# Patient Record
Sex: MALE | Race: BLACK OR AFRICAN AMERICAN | NOT HISPANIC OR LATINO | URBAN - METROPOLITAN AREA
[De-identification: names, ages, dates, MRNs, and addresses within clinical notes are randomized per-mention and may not be internally consistent; named-entity substitution may affect disease eponyms.]

---

## 2015-03-04 RX ORDER — ATORVASTATIN CALCIUM 80 MG/1
1 TABLET, FILM COATED ORAL
Qty: 0 | Refills: 0 | DISCHARGE
Start: 2015-03-04

## 2017-08-14 ENCOUNTER — INPATIENT (INPATIENT)
Facility: HOSPITAL | Age: 57
LOS: 4 days | Discharge: HOME | DRG: 286 | End: 2017-08-19
Attending: GENERAL PRACTICE | Admitting: GENERAL PRACTICE
Payer: COMMERCIAL

## 2017-08-14 VITALS
TEMPERATURE: 99 F | OXYGEN SATURATION: 97 % | WEIGHT: 270.07 LBS | RESPIRATION RATE: 16 BRPM | DIASTOLIC BLOOD PRESSURE: 78 MMHG | HEART RATE: 80 BPM | SYSTOLIC BLOOD PRESSURE: 120 MMHG

## 2017-08-14 DIAGNOSIS — I50.22 CHRONIC SYSTOLIC (CONGESTIVE) HEART FAILURE: ICD-10-CM

## 2017-08-14 DIAGNOSIS — E78.4 OTHER HYPERLIPIDEMIA: ICD-10-CM

## 2017-08-14 DIAGNOSIS — I48.91 UNSPECIFIED ATRIAL FIBRILLATION: ICD-10-CM

## 2017-08-14 DIAGNOSIS — I10 ESSENTIAL (PRIMARY) HYPERTENSION: ICD-10-CM

## 2017-08-14 DIAGNOSIS — I20.8 OTHER FORMS OF ANGINA PECTORIS: ICD-10-CM

## 2017-08-14 DIAGNOSIS — Z95.810 PRESENCE OF AUTOMATIC (IMPLANTABLE) CARDIAC DEFIBRILLATOR: Chronic | ICD-10-CM

## 2017-08-14 LAB
ANION GAP SERPL CALC-SCNC: 14 MMOL/L — SIGNIFICANT CHANGE UP (ref 5–17)
APTT BLD: 32 SEC — SIGNIFICANT CHANGE UP (ref 27.5–37.4)
APTT BLD: 54.5 SEC — HIGH (ref 27.5–37.4)
BASOPHILS # BLD AUTO: 0.2 K/UL — SIGNIFICANT CHANGE UP (ref 0–0.2)
BASOPHILS NFR BLD AUTO: 3 % — HIGH (ref 0–2)
BUN SERPL-MCNC: 14 MG/DL — SIGNIFICANT CHANGE UP (ref 7–23)
CALCIUM SERPL-MCNC: 9.2 MG/DL — SIGNIFICANT CHANGE UP (ref 8.4–10.5)
CHLORIDE SERPL-SCNC: 107 MMOL/L — SIGNIFICANT CHANGE UP (ref 96–108)
CK MB BLD-MCNC: 0.7 % — SIGNIFICANT CHANGE UP (ref 0–3.5)
CK MB BLD-MCNC: 0.7 % — SIGNIFICANT CHANGE UP (ref 0–3.5)
CK MB CFR SERPL CALC: 2.2 NG/ML — SIGNIFICANT CHANGE UP (ref 0–6.7)
CK MB CFR SERPL CALC: 2.2 NG/ML — SIGNIFICANT CHANGE UP (ref 0–6.7)
CK SERPL-CCNC: 297 U/L — HIGH (ref 30–200)
CK SERPL-CCNC: 312 U/L — HIGH (ref 30–200)
CO2 SERPL-SCNC: 22 MMOL/L — SIGNIFICANT CHANGE UP (ref 22–31)
CREAT SERPL-MCNC: 1.23 MG/DL — SIGNIFICANT CHANGE UP (ref 0.5–1.3)
DIGOXIN SERPL-MCNC: 0.3 NG/ML — LOW (ref 0.8–2)
EOSINOPHIL # BLD AUTO: 0.5 K/UL — SIGNIFICANT CHANGE UP (ref 0–0.5)
EOSINOPHIL NFR BLD AUTO: 7.2 % — HIGH (ref 0–6)
GLUCOSE SERPL-MCNC: 112 MG/DL — HIGH (ref 70–99)
HCT VFR BLD CALC: 46.2 % — SIGNIFICANT CHANGE UP (ref 39–50)
HCT VFR BLD CALC: 46.8 % — SIGNIFICANT CHANGE UP (ref 39–50)
HGB BLD-MCNC: 15.5 G/DL — SIGNIFICANT CHANGE UP (ref 13–17)
HGB BLD-MCNC: 15.6 G/DL — SIGNIFICANT CHANGE UP (ref 13–17)
INR BLD: 1.12 RATIO — SIGNIFICANT CHANGE UP (ref 0.88–1.16)
LYMPHOCYTES # BLD AUTO: 1.1 K/UL — SIGNIFICANT CHANGE UP (ref 1–3.3)
LYMPHOCYTES # BLD AUTO: 16.7 % — SIGNIFICANT CHANGE UP (ref 13–44)
MAGNESIUM SERPL-MCNC: 2 MG/DL — SIGNIFICANT CHANGE UP (ref 1.6–2.6)
MCHC RBC-ENTMCNC: 33.2 GM/DL — SIGNIFICANT CHANGE UP (ref 32–36)
MCHC RBC-ENTMCNC: 33.3 PG — SIGNIFICANT CHANGE UP (ref 27–34)
MCHC RBC-ENTMCNC: 33.7 GM/DL — SIGNIFICANT CHANGE UP (ref 32–36)
MCHC RBC-ENTMCNC: 33.7 PG — SIGNIFICANT CHANGE UP (ref 27–34)
MCV RBC AUTO: 100 FL — SIGNIFICANT CHANGE UP (ref 80–100)
MCV RBC AUTO: 100 FL — SIGNIFICANT CHANGE UP (ref 80–100)
MONOCYTES # BLD AUTO: 0.6 K/UL — SIGNIFICANT CHANGE UP (ref 0–0.9)
MONOCYTES NFR BLD AUTO: 10.1 % — SIGNIFICANT CHANGE UP (ref 2–14)
NEUTROPHILS # BLD AUTO: 4 K/UL — SIGNIFICANT CHANGE UP (ref 1.8–7.4)
NEUTROPHILS NFR BLD AUTO: 63 % — SIGNIFICANT CHANGE UP (ref 43–77)
NT-PROBNP SERPL-SCNC: 667 PG/ML — HIGH (ref 0–300)
PHOSPHATE SERPL-MCNC: 3.1 MG/DL — SIGNIFICANT CHANGE UP (ref 2.5–4.5)
PLATELET # BLD AUTO: 154 K/UL — SIGNIFICANT CHANGE UP (ref 150–400)
PLATELET # BLD AUTO: 164 K/UL — SIGNIFICANT CHANGE UP (ref 150–400)
POTASSIUM SERPL-MCNC: 4.3 MMOL/L — SIGNIFICANT CHANGE UP (ref 3.5–5.3)
POTASSIUM SERPL-SCNC: 4.3 MMOL/L — SIGNIFICANT CHANGE UP (ref 3.5–5.3)
PROTHROM AB SERPL-ACNC: 12.2 SEC — SIGNIFICANT CHANGE UP (ref 9.8–12.7)
RBC # BLD: 4.61 M/UL — SIGNIFICANT CHANGE UP (ref 4.2–5.8)
RBC # BLD: 4.67 M/UL — SIGNIFICANT CHANGE UP (ref 4.2–5.8)
RBC # FLD: 12.9 % — SIGNIFICANT CHANGE UP (ref 10.3–14.5)
RBC # FLD: 12.9 % — SIGNIFICANT CHANGE UP (ref 10.3–14.5)
SODIUM SERPL-SCNC: 143 MMOL/L — SIGNIFICANT CHANGE UP (ref 135–145)
TROPONIN T SERPL-MCNC: <0.01 NG/ML — SIGNIFICANT CHANGE UP (ref 0–0.06)
TROPONIN T SERPL-MCNC: <0.01 NG/ML — SIGNIFICANT CHANGE UP (ref 0–0.06)
WBC # BLD: 6.4 K/UL — SIGNIFICANT CHANGE UP (ref 3.8–10.5)
WBC # BLD: 6.8 K/UL — SIGNIFICANT CHANGE UP (ref 3.8–10.5)
WBC # FLD AUTO: 6.4 K/UL — SIGNIFICANT CHANGE UP (ref 3.8–10.5)
WBC # FLD AUTO: 6.8 K/UL — SIGNIFICANT CHANGE UP (ref 3.8–10.5)

## 2017-08-14 PROCEDURE — 93308 TTE F-UP OR LMTD: CPT | Mod: 26

## 2017-08-14 PROCEDURE — 99285 EMERGENCY DEPT VISIT HI MDM: CPT | Mod: 25

## 2017-08-14 PROCEDURE — 93010 ELECTROCARDIOGRAM REPORT: CPT

## 2017-08-14 PROCEDURE — 71020: CPT | Mod: 26

## 2017-08-14 RX ORDER — HEPARIN SODIUM 5000 [USP'U]/ML
6000 INJECTION INTRAVENOUS; SUBCUTANEOUS EVERY 6 HOURS
Qty: 0 | Refills: 0 | Status: DISCONTINUED | OUTPATIENT
Start: 2017-08-14 | End: 2017-08-15

## 2017-08-14 RX ORDER — HEPARIN SODIUM 5000 [USP'U]/ML
INJECTION INTRAVENOUS; SUBCUTANEOUS
Qty: 25000 | Refills: 0 | Status: DISCONTINUED | OUTPATIENT
Start: 2017-08-14 | End: 2017-08-15

## 2017-08-14 RX ORDER — METOPROLOL TARTRATE 50 MG
100 TABLET ORAL DAILY
Qty: 0 | Refills: 0 | Status: DISCONTINUED | OUTPATIENT
Start: 2017-08-14 | End: 2017-08-19

## 2017-08-14 RX ORDER — ASPIRIN/CALCIUM CARB/MAGNESIUM 324 MG
81 TABLET ORAL DAILY
Qty: 0 | Refills: 0 | Status: DISCONTINUED | OUTPATIENT
Start: 2017-08-14 | End: 2017-08-19

## 2017-08-14 RX ORDER — SPIRONOLACTONE 25 MG/1
0 TABLET, FILM COATED ORAL
Qty: 0 | Refills: 0 | COMMUNITY

## 2017-08-14 RX ORDER — DIGOXIN 250 MCG
0.12 TABLET ORAL DAILY
Qty: 0 | Refills: 0 | Status: DISCONTINUED | OUTPATIENT
Start: 2017-08-14 | End: 2017-08-19

## 2017-08-14 RX ORDER — ATORVASTATIN CALCIUM 80 MG/1
20 TABLET, FILM COATED ORAL AT BEDTIME
Qty: 0 | Refills: 0 | Status: DISCONTINUED | OUTPATIENT
Start: 2017-08-14 | End: 2017-08-19

## 2017-08-14 RX ORDER — SPIRONOLACTONE 25 MG/1
25 TABLET, FILM COATED ORAL DAILY
Qty: 0 | Refills: 0 | Status: DISCONTINUED | OUTPATIENT
Start: 2017-08-14 | End: 2017-08-19

## 2017-08-14 RX ORDER — FUROSEMIDE 40 MG
40 TABLET ORAL DAILY
Qty: 0 | Refills: 0 | Status: DISCONTINUED | OUTPATIENT
Start: 2017-08-14 | End: 2017-08-19

## 2017-08-14 RX ORDER — HEPARIN SODIUM 5000 [USP'U]/ML
5000 INJECTION INTRAVENOUS; SUBCUTANEOUS ONCE
Qty: 0 | Refills: 0 | Status: COMPLETED | OUTPATIENT
Start: 2017-08-14 | End: 2017-08-14

## 2017-08-14 RX ORDER — SACUBITRIL AND VALSARTAN 24; 26 MG/1; MG/1
1 TABLET, FILM COATED ORAL
Qty: 0 | Refills: 0 | Status: DISCONTINUED | OUTPATIENT
Start: 2017-08-14 | End: 2017-08-19

## 2017-08-14 RX ORDER — SACUBITRIL AND VALSARTAN 24; 26 MG/1; MG/1
0 TABLET, FILM COATED ORAL
Qty: 0 | Refills: 0 | COMMUNITY

## 2017-08-14 RX ADMIN — HEPARIN SODIUM 1000 UNIT(S)/HR: 5000 INJECTION INTRAVENOUS; SUBCUTANEOUS at 21:37

## 2017-08-14 RX ADMIN — SACUBITRIL AND VALSARTAN 1 TABLET(S): 24; 26 TABLET, FILM COATED ORAL at 21:56

## 2017-08-14 RX ADMIN — HEPARIN SODIUM 1000 UNIT(S)/HR: 5000 INJECTION INTRAVENOUS; SUBCUTANEOUS at 14:26

## 2017-08-14 RX ADMIN — HEPARIN SODIUM 5000 UNIT(S): 5000 INJECTION INTRAVENOUS; SUBCUTANEOUS at 14:24

## 2017-08-14 NOTE — ED ADULT NURSE NOTE - OBJECTIVE STATEMENT
Pt is a 57 yo M who came to the ED amb c/o sent for new onset A fib. Pt states he saw Dr. Euceda today for a routine check up, had pacemaker interrogated, sent for new onset A fib. States he also has dizziness upon standing or when walking too much for the last month. States he also has chest pain for the last month which he attributes to "gas", CP relieved upon belching. Overall, he has intermittent CP/palpitations for the past 2 years s/p dx CHF, pacemaker inserted at that time. A/O x3, NAD, denies CP at present, no fever/chills, no n/v, no diaphoresis.

## 2017-08-14 NOTE — ED PROVIDER NOTE - OBJECTIVE STATEMENT
57yo male pmh CHF EF 32% (2015), AICD, HTN p/w new onset afib from Dr. Euceda's office. C/o dyspnea on exertion, palpitations and dizziness  worsening over past month. Intermittent chest pain at rest, migrates to LUQ abdomen, no CP with exertion. Denies LOC, f/c/n/v/d, recent travel or illness. Not on anticoagulation

## 2017-08-14 NOTE — H&P ADULT - HISTORY OF PRESENT ILLNESS
Patient is a 56y old  man with PMH as bellow who presented to Dr Euceda office for evaluation found to be in new onset Afib abd sent in for further eval and management.    HPI: 56M with CHF EF 32% (2015), AICD, HTN, under evaluation for possible transplant, c/o dyspnea on exertion, palpitations and dizziness, found to be in Afib.  + Intermittent chest pain at rest, migrates to LUQ abdomen, no CP with exertion. Denies LOC, f/c/n/v/d, recent travel or illness.     heparin drip started in ED         *****PAST MEDICAL / Surgical  HISTORY:  PAST MEDICAL & SURGICAL HISTORY:  Hyperlipidemia  CHF (congestive heart failure)  Bronchitis  Hypertension  AICD (automatic cardioverter/defibrillator) present         *****FAMILY HISTORY:  multiple family members with heart disease and sudden cardiac death         *****SOCIAL HISTORY:  Alcohol: reports ~monthly intake  Smoking: used to smoke 1PPD for 10 years, currently about 5 cig/day

## 2017-08-14 NOTE — ED PROVIDER NOTE - PHYSICAL EXAMINATION
Attending Akins: Gen: NAD, heent: atrauamtic, eomi, perrla, mmm, op pink, uvula midline, neck; nttp, no nuchal rigidity, chest: nttp, no crepitus, cv: rrr, +murmur, lungs: ctab, abd: soft, nontender, nondistended, no peritoneal signs, +BS, no guarding, ext: wwp, neg homans, skin: no rash, neuro: awake and alert, following commands, speech clear, sensation and strength intact, no focal deficits

## 2017-08-14 NOTE — H&P ADULT - NSHPPHYSICALEXAM_GEN_ALL_CORE
Vital Signs Last 24 Hrs  T(C): 37.1 (08-14-17 @ 13:01)  T(F): 98.8 (08-14-17 @ 13:01), Max: 98.8 (08-14-17 @ 13:01)  HR: 80 (08-14-17 @ 13:01) (80 - 80)  BP: 120/78 (08-14-17 @ 13:01)  BP(mean): --  RR: 16 (08-14-17 @ 13:01) (16 - 16)  SpO2: 97% (08-14-17 @ 13:01) (97% - 97%)        GEN: A&O X 3 , NAD , comfortable  HEENT: NCAT, PERRL, MMM, hearing intact  Neck: supple , no JVD  CVS: S1S2 , Iregular , No M/R/G appreciated, not tachycardic  PULM: CTA B/L,  no W/R/R appreciated  ABD.: soft. non tender, non distended,  bowel sounds present  Extrem: intact pulses , trace edema   Derm: No rash , no ecchymoses  PSYCH : normal mood,  no delusion not anxious

## 2017-08-14 NOTE — CONSULT NOTE ADULT - SUBJECTIVE AND OBJECTIVE BOX
Patient is a 56y old  Male who presents with a chief complaint of FLOREZ.    HPI: 56M with CHF EF 32% (2015), AICD, HTN p/w new onset afib from Dr. Euceda's office. C/o dyspnea on exertion, palpitations and dizziness  worsening over past month. Intermittent chest pain at rest, migrates to LUQ abdomen, no CP with exertion. Denies LOC, f/c/n/v/d, recent travel or illness.          *****PAST MEDICAL / Surgical  HISTORY:  PAST MEDICAL & SURGICAL HISTORY:  Hyperlipidemia  CHF (congestive heart failure)  Bronchitis  Hypertension  AICD (automatic cardioverter/defibrillator) present           *****FAMILY HISTORY:  FAMILY HISTORY:  Family history of heart disease (Sibling)           *****SOCIAL HISTORY:  Alcohol: None  Smoking: None         *****ALLERGIES:   Allergies    No Known Allergies    Intolerances             *****MEDICATIONS:  MEDICATIONS  (STANDING):  heparin  Infusion.  Unit(s)/Hr (10 mL/Hr) IV Continuous <Continuous>    MEDICATIONS  (PRN):  heparin  Injectable 6000 Unit(s) IV Push every 6 hours PRN For aPTT less than 40           *****REVIEW OF SYSTEM:  GEN: no fever, no chills, no pain  RESP: no SOB, no cough, no sputum  CVS: no chest pain, no palpitations, no edema  GI: no abdominal pain, no nausea, no vomiting, no constipation, no diarrhea  : no dysurea, no frequency, no hematurea  Neuro: no headache, no dizziness  PSYCH: no anxiety, no depression  Derm : no itching, no rash         *****VITAL SIGNS:  T(C): 37.1 (08-14-17 @ 13:01), Max: 37.1 (08-14-17 @ 13:01)  HR: 80 (08-14-17 @ 13:01) (80 - 80)  BP: 120/78 (08-14-17 @ 13:01) (120/78 - 120/78)  RR: 16 (08-14-17 @ 13:01) (16 - 16)  SpO2: 97% (08-14-17 @ 13:01) (97% - 97%)  Wt(kg): --           *****PHYSICAL EXAM:  GEN: A&O X 3 , NAD , comfortable  HEENT: NCAT, PERRL, MMM, hearing intact  Neck: supple , no JVD  CVS: S1S2 , regular , No M/R/G appreciated  PULM: CTA B/L,  no W/R/R appreciated  ABD.: soft. non tender, non distended,  bowel sounds present  Extrem: intact pulses , no edema   Derm: No rash , no ecchymoses  PSYCH : normal mood,  no delusion not anxious         *****LAB AND IMAGING:                          15.5   6.4   )-----------( 164      ( 14 Aug 2017 14:16 )             46.8                     PT/INR - ( 14 Aug 2017 14:16 )   PT: 12.2 sec;   INR: 1.12 ratio         PTT - ( 14 Aug 2017 14:16 )  PTT:32.0 sec                            [All pertinent recent Imaging reports reviewed]         *****A S S E S S M E N T   A N D   P L A N :    56M likely acute on chronic systolic CHF, PAF  check snp  lasix for O>I  recent FLOREZ ?anginal equivalent  R+L cath this adm  EPS eval for possible DCCV (would need LORE)  AC            ___________________________  Will follow with you.  Thank you,  LIDA Judge D.O.

## 2017-08-14 NOTE — H&P ADULT - NSHPREVIEWOFSYSTEMS_GEN_ALL_CORE
GEN: no fever, no chills, no pain  RESP: no SOB currently, no cough, no sputum  CVS: no chest pain currently, no palpitations currently, no edema  GI: no abdominal pain, no nausea, no vomiting, no constipation, no diarrhea  : no dysuria, no frequency, no hematuria  NEURO: no headache, no dizziness  PSYCH: no depression, not anxious  Derm : no itching, no rash

## 2017-08-15 ENCOUNTER — TRANSCRIPTION ENCOUNTER (OUTPATIENT)
Age: 57
End: 2017-08-15

## 2017-08-15 LAB
ALBUMIN SERPL ELPH-MCNC: 4.1 G/DL — SIGNIFICANT CHANGE UP (ref 3.3–5)
ALP SERPL-CCNC: 48 U/L — SIGNIFICANT CHANGE UP (ref 40–120)
ALT FLD-CCNC: 18 U/L RC — SIGNIFICANT CHANGE UP (ref 10–45)
ANION GAP SERPL CALC-SCNC: 16 MMOL/L — SIGNIFICANT CHANGE UP (ref 5–17)
ANISOCYTOSIS BLD QL: SLIGHT — SIGNIFICANT CHANGE UP
APTT BLD: 40.6 SEC — HIGH (ref 27.5–37.4)
APTT BLD: 44.6 SEC — HIGH (ref 27.5–37.4)
AST SERPL-CCNC: 19 U/L — SIGNIFICANT CHANGE UP (ref 10–40)
BASOPHILS # BLD AUTO: 0 K/UL — SIGNIFICANT CHANGE UP (ref 0–0.2)
BILIRUB SERPL-MCNC: 1.1 MG/DL — SIGNIFICANT CHANGE UP (ref 0.2–1.2)
BUN SERPL-MCNC: 14 MG/DL — SIGNIFICANT CHANGE UP (ref 7–23)
CALCIUM SERPL-MCNC: 9 MG/DL — SIGNIFICANT CHANGE UP (ref 8.4–10.5)
CHLORIDE SERPL-SCNC: 107 MMOL/L — SIGNIFICANT CHANGE UP (ref 96–108)
CHOLEST SERPL-MCNC: 103 MG/DL — SIGNIFICANT CHANGE UP (ref 10–199)
CK MB BLD-MCNC: 0.8 % — SIGNIFICANT CHANGE UP (ref 0–3.5)
CK MB CFR SERPL CALC: 2.2 NG/ML — SIGNIFICANT CHANGE UP (ref 0–6.7)
CK SERPL-CCNC: 279 U/L — HIGH (ref 30–200)
CO2 SERPL-SCNC: 19 MMOL/L — LOW (ref 22–31)
CREAT SERPL-MCNC: 1.22 MG/DL — SIGNIFICANT CHANGE UP (ref 0.5–1.3)
ELLIPTOCYTES BLD QL SMEAR: SLIGHT — SIGNIFICANT CHANGE UP
EOSINOPHIL # BLD AUTO: 0.4 K/UL — SIGNIFICANT CHANGE UP (ref 0–0.5)
EOSINOPHIL NFR BLD AUTO: 7 % — HIGH (ref 0–6)
GLUCOSE SERPL-MCNC: 93 MG/DL — SIGNIFICANT CHANGE UP (ref 70–99)
HBA1C BLD-MCNC: 5.7 % — HIGH (ref 4–5.6)
HCT VFR BLD CALC: 45 % — SIGNIFICANT CHANGE UP (ref 39–50)
HDLC SERPL-MCNC: 36 MG/DL — LOW (ref 40–125)
HGB BLD-MCNC: 15.7 G/DL — SIGNIFICANT CHANGE UP (ref 13–17)
LIPID PNL WITH DIRECT LDL SERPL: 48 MG/DL — SIGNIFICANT CHANGE UP
LYMPHOCYTES # BLD AUTO: 1.1 K/UL — SIGNIFICANT CHANGE UP (ref 1–3.3)
LYMPHOCYTES # BLD AUTO: 18 % — SIGNIFICANT CHANGE UP (ref 13–44)
MACROCYTES BLD QL: SLIGHT — SIGNIFICANT CHANGE UP
MAGNESIUM SERPL-MCNC: 2 MG/DL — SIGNIFICANT CHANGE UP (ref 1.6–2.6)
MCHC RBC-ENTMCNC: 34.9 GM/DL — SIGNIFICANT CHANGE UP (ref 32–36)
MCHC RBC-ENTMCNC: 35.3 PG — HIGH (ref 27–34)
MCV RBC AUTO: 101 FL — HIGH (ref 80–100)
MONOCYTES # BLD AUTO: 0.8 K/UL — SIGNIFICANT CHANGE UP (ref 0–0.9)
MONOCYTES NFR BLD AUTO: 13 % — SIGNIFICANT CHANGE UP (ref 2–14)
NEUTROPHILS # BLD AUTO: 3.7 K/UL — SIGNIFICANT CHANGE UP (ref 1.8–7.4)
NEUTROPHILS NFR BLD AUTO: 62 % — SIGNIFICANT CHANGE UP (ref 43–77)
PLAT MORPH BLD: NORMAL — SIGNIFICANT CHANGE UP
PLATELET # BLD AUTO: 152 K/UL — SIGNIFICANT CHANGE UP (ref 150–400)
POIKILOCYTOSIS BLD QL AUTO: SLIGHT — SIGNIFICANT CHANGE UP
POLYCHROMASIA BLD QL SMEAR: SLIGHT — SIGNIFICANT CHANGE UP
POTASSIUM SERPL-MCNC: 4.2 MMOL/L — SIGNIFICANT CHANGE UP (ref 3.5–5.3)
POTASSIUM SERPL-SCNC: 4.2 MMOL/L — SIGNIFICANT CHANGE UP (ref 3.5–5.3)
PROT SERPL-MCNC: 6.8 G/DL — SIGNIFICANT CHANGE UP (ref 6–8.3)
RBC # BLD: 4.45 M/UL — SIGNIFICANT CHANGE UP (ref 4.2–5.8)
RBC # FLD: 13.1 % — SIGNIFICANT CHANGE UP (ref 10.3–14.5)
RBC BLD AUTO: ABNORMAL
SODIUM SERPL-SCNC: 142 MMOL/L — SIGNIFICANT CHANGE UP (ref 135–145)
T3 SERPL-MCNC: 106 NG/DL — SIGNIFICANT CHANGE UP (ref 80–200)
T4 AB SER-ACNC: 6.1 UG/DL — SIGNIFICANT CHANGE UP (ref 4.6–12)
TOTAL CHOLESTEROL/HDL RATIO MEASUREMENT: 2.9 RATIO — LOW (ref 3.4–9.6)
TRIGL SERPL-MCNC: 97 MG/DL — SIGNIFICANT CHANGE UP (ref 10–149)
TROPONIN T SERPL-MCNC: <0.01 NG/ML — SIGNIFICANT CHANGE UP (ref 0–0.06)
TSH SERPL-MCNC: 2.04 UIU/ML — SIGNIFICANT CHANGE UP (ref 0.27–4.2)
TSH SERPL-MCNC: 2.56 UIU/ML — SIGNIFICANT CHANGE UP (ref 0.27–4.2)
WBC # BLD: 5.9 K/UL — SIGNIFICANT CHANGE UP (ref 3.8–10.5)
WBC # FLD AUTO: 5.9 K/UL — SIGNIFICANT CHANGE UP (ref 3.8–10.5)

## 2017-08-15 RX ORDER — AMIODARONE HYDROCHLORIDE 400 MG/1
400 TABLET ORAL THREE TIMES A DAY
Qty: 0 | Refills: 0 | Status: DISCONTINUED | OUTPATIENT
Start: 2017-08-15 | End: 2017-08-15

## 2017-08-15 RX ORDER — APIXABAN 2.5 MG/1
5 TABLET, FILM COATED ORAL EVERY 12 HOURS
Qty: 0 | Refills: 0 | Status: DISCONTINUED | OUTPATIENT
Start: 2017-08-15 | End: 2017-08-19

## 2017-08-15 RX ORDER — AMIODARONE HYDROCHLORIDE 400 MG/1
400 TABLET ORAL THREE TIMES A DAY
Qty: 0 | Refills: 0 | Status: DISCONTINUED | OUTPATIENT
Start: 2017-08-15 | End: 2017-08-19

## 2017-08-15 RX ORDER — APIXABAN 2.5 MG/1
1 TABLET, FILM COATED ORAL
Qty: 60 | Refills: 0 | OUTPATIENT
Start: 2017-08-15 | End: 2017-09-14

## 2017-08-15 RX ADMIN — SPIRONOLACTONE 25 MILLIGRAM(S): 25 TABLET, FILM COATED ORAL at 10:32

## 2017-08-15 RX ADMIN — SACUBITRIL AND VALSARTAN 1 TABLET(S): 24; 26 TABLET, FILM COATED ORAL at 18:08

## 2017-08-15 RX ADMIN — APIXABAN 5 MILLIGRAM(S): 2.5 TABLET, FILM COATED ORAL at 18:08

## 2017-08-15 RX ADMIN — ATORVASTATIN CALCIUM 20 MILLIGRAM(S): 80 TABLET, FILM COATED ORAL at 22:06

## 2017-08-15 RX ADMIN — Medication 100 MILLIGRAM(S): at 10:32

## 2017-08-15 RX ADMIN — AMIODARONE HYDROCHLORIDE 400 MILLIGRAM(S): 400 TABLET ORAL at 18:08

## 2017-08-15 RX ADMIN — HEPARIN SODIUM 1200 UNIT(S)/HR: 5000 INJECTION INTRAVENOUS; SUBCUTANEOUS at 04:25

## 2017-08-15 RX ADMIN — Medication 81 MILLIGRAM(S): at 10:32

## 2017-08-15 RX ADMIN — HEPARIN SODIUM 1400 UNIT(S)/HR: 5000 INJECTION INTRAVENOUS; SUBCUTANEOUS at 13:46

## 2017-08-15 RX ADMIN — Medication 0.12 MILLIGRAM(S): at 05:13

## 2017-08-15 RX ADMIN — SACUBITRIL AND VALSARTAN 1 TABLET(S): 24; 26 TABLET, FILM COATED ORAL at 05:13

## 2017-08-15 NOTE — DISCHARGE NOTE ADULT - CARE PLAN
Principal Discharge DX:	Atrial fibrillation, unspecified type  Goal:	resolved  Instructions for follow-up, activity and diet:	Continue Amiodarone 200mg po daily  Apixaban 5 mg po every 12 hours  Continue the rest of the medication PTA  Moderate activity and lifestyle modifications such as low fat and no salt diet, exercise as tolerated  Follow up with cardiologist and primary physician after a week  Patient education on new medications: ELiquis and Amiodarone

## 2017-08-15 NOTE — CONSULT NOTE ADULT - SUBJECTIVE AND OBJECTIVE BOX
[facility]  [facility]     CONSULTATION NOTE   ________________________________________________      CHIEF COMPLAINT:Patient is a 56y old  Male who presents with a chief complaint of sob with new onset ofd a.fib. 2017 18:29)      HPI:  Patient is a 56y old  man with PMH as bellow who presented to pmd office for evaluation found to be in new onset Afib abd sent in for further eval and management.    HPI: 56M with CHF EF 32% (2015), AICD, HTN, under evaluation for possible transplant, c/o dyspnea on exertion, palpitations and dizziness, found to be in Afib.  + Intermittent chest pain at rest, migrates to LUQ abdomen, no CP with exertion. Denies LOC, f/c/n/v/d, recent travel or illness.     heparin drip started in ED         *****PAST MEDICAL / Surgical  HISTORY:  PAST MEDICAL & SURGICAL HISTORY:  Hyperlipidemia  CHF (congestive heart failure)  Bronchitis  Hypertension  AICD (automatic cardioverter/defibrillator) present         *****FAMILY HISTORY:  multiple family members with heart disease and sudden cardiac death         *****SOCIAL HISTORY:  Alcohol: reports ~monthly intake  Smoking: used to smoke 1PPD for 10 years, currently about 5 cig/day (14 Aug 2017 14:38)      PAST MEDICAL & SURGICAL HISTORY:  Hyperlipidemia  CHF (congestive heart failure)  Bronchitis  Hypertension  AICD (automatic cardioverter/defibrillator) present      MEDICATIONS  (STANDING):  heparin  Infusion.  Unit(s)/Hr (10 mL/Hr) IV Continuous <Continuous>  spironolactone 25 milliGRAM(s) Oral daily  aspirin  chewable 81 milliGRAM(s) Oral daily  sacubitril 97 mG/valsartan 103 mG 1 Tablet(s) Oral two times a day  digoxin     Tablet 0.125 milliGRAM(s) Oral daily  atorvastatin 20 milliGRAM(s) Oral at bedtime  metoprolol succinate  milliGRAM(s) Oral daily  furosemide    Tablet 40 milliGRAM(s) Oral daily  amiodarone    Tablet 400 milliGRAM(s) Oral three times a day  apixaban 5 milliGRAM(s) Oral every 12 hours    MEDICATIONS  (PRN):  heparin  Injectable 6000 Unit(s) IV Push every 6 hours PRN For aPTT less than 40      FAMILY HISTORY:  Family history of heart disease (Sibling)      SOCIAL HISTORY:    [ ] Non-smoker  [ ] Smoker  [ ] Alcohol    Allergies    No Known Allergies    Intolerances    	    REVIEW OF SYSTEMS:  CONSTITUTIONAL: No fever, weight loss, or fatigue  EYES: No eye pain, visual disturbances, or discharge  ENT:  No difficulty hearing, tinnitus, vertigo; No sinus or throat pain  NECK: No pain or stiffness  RESPIRATORY: No cough, wheezing, chills or hemoptysis; + Shortness of Breath  CARDIOVASCULAR: No chest pain,+  palpitations, passing out, dizziness, or leg swelling  GASTROINTESTINAL: No abdominal or epigastric pain. No nausea, vomiting, or hematemesis; No diarrhea or constipation. No melena or hematochezia.  GENITOURINARY: No dysuria, frequency, hematuria, or incontinence  NEUROLOGICAL: No headaches, memory loss, loss of strength, numbness, or tremors  SKIN: No itching, burning, rashes, or lesions   LYMPH Nodes: No enlarged glands  ENDOCRINE: No heat or cold intolerance; No hair loss  MUSCULOSKELETAL: No joint pain or swelling; No muscle, back, or extremity pain  PSYCHIATRIC: No depression, anxiety, mood swings, or difficulty sleeping  HEME/LYMPH: No easy bruising, or bleeding gums  ALLERGY AND IMMUNOLOGIC: No hives or eczema	    [ ] All others negative	  [ ] Unable to obtain    PHYSICAL EXAM:  T(C): 36.3 (08-15-17 @ 13:48), Max: 37 (08-15-17 @ 10:04)  HR: 79 (08-15-17 @ 13:48) (77 - 108)  BP: 113/82 (08-15-17 @ 13:48) (102/71 - 139/87)  RR: 18 (08-15-17 @ 13:48) (18 - 18)  SpO2: 98% (08-15-17 @ 13:48) (98% - 99%)  Wt(kg): --  I&O's Summary    14 Aug 2017 07:01  -  15 Aug 2017 07:00  --------------------------------------------------------  IN: 580 mL / OUT: 0 mL / NET: 580 mL    15 Aug 2017 07:01  -  15 Aug 2017 16:11  --------------------------------------------------------  IN: 240 mL / OUT: 0 mL / NET: 240 mL        Appearance: Normal	  HEENT:   Normal oral mucosa, PERRL, EOMI	  Lymphatic: No lymphadenopathy  Cardiovascular: Normal S1 S2, No JVD, + systolic  murmurs, No edema  Respiratory: Lungs clear to auscultation	  Psychiatry: A & O x 3, Mood & affect appropriate  Gastrointestinal:  Soft, Non-tender, + BS	  Skin: No rashes, No ecchymoses, No cyanosis	  Neurologic: Non-focal  Extremities: Normal range of motion, No clubbing, cyanosis or edema  Vascular: Peripheral pulses palpable 2+ bilaterally    TELEMETRY: a.fib	    ECG:  	  RADIOLOGY:  OTHER: 	  	  LABS:	 	    CARDIAC MARKERS:  CARDIAC MARKERS ( 15 Aug 2017 03:35 )  x     / <0.01 ng/mL / 279 U/L / x     / 2.2 ng/mL  CARDIAC MARKERS ( 14 Aug 2017 20:51 )  x     / <0.01 ng/mL / 297 U/L / x     / 2.2 ng/mL  CARDIAC MARKERS ( 14 Aug 2017 14:16 )  x     / <0.01 ng/mL / 312 U/L / x     / 2.2 ng/mL                              15.7   5.9   )-----------( 152      ( 15 Aug 2017 06:48 )             45.0     08-15    142  |  107  |  14  ----------------------------<  93  4.2   |  19<L>  |  1.22    Ca    9.0      15 Aug 2017 06:46  Phos  3.1     08-14  Mg     2.0     08-15    TPro  6.8  /  Alb  4.1  /  TBili  1.1  /  DBili  x   /  AST  19  /  ALT  18  /  AlkPhos  48  08-15    proBNP:   Lipid Profile: Cholesterol 103  LDL 48  HDL 36  TG 97    HgA1c: Hemoglobin A1C, Whole Blood: 5.7 % (08-15 @ 08:39)    TSH: Thyroid Stimulating Hormone, Serum: 2.56 uIU/mL (08-15 @ 08:41)  Thyroid Stimulating Hormone, Serum: 2.04 uIU/mL (08-15 @ 01:37)      < from: Cardiac Cath Lab - Adult (08.15.17 @ 11:29) >  VENTRICLES: EF estimated was 20 %.  CORONARY VESSELS: The coronary circulation is left dominant.  LM:   -- LM: Normal.  LAD:   --  LAD: Normal.  CX:   --  Circumflex: Normal.  RCA:   --  RCA: Normal.  COMPLICATIONS: There were no complications.  DIAGNOSTIC IMPRESSIONS: The coronary anatomy is normal. Left ventricular  function is abnormal.    < end of copied text >

## 2017-08-15 NOTE — DISCHARGE NOTE ADULT - CARE PROVIDER_API CALL
Parisa Euceda (DO), Cardiology Medicine  [address] [place] [id number]  Phone: [phone]  Fax: [phone]

## 2017-08-15 NOTE — DISCHARGE NOTE ADULT - HOSPITAL COURSE
to be completed by the attending 56M with CHF EF 32% (2015), AICD, HTN, under evaluation for possible transplant, c/o dyspnea on exertion, palpitations and dizziness, found to be in Afib.  + Intermittent chest pain at rest, migrates to LUQ abdomen, no CP with exertion. Denies LOC, f/c/n/v/d, recent travel or illness.     heparin drip started in ED    [place] of hospital course:  Patient was seen and evaluated and followed by multiple specialists throughout the stay and treated medically with improvement.  Patient was otherwise stable and had no other complications.  Patient was discharged to home in stable condition to follow up with primary doctor as outpatient for follow up and further care.

## 2017-08-15 NOTE — DISCHARGE NOTE ADULT - PATIENT PORTAL LINK FT
“You can access the Follow[employer] Patient Portal, offered by [facility], by registering with the following website: [email or web address]”

## 2017-08-15 NOTE — DISCHARGE NOTE ADULT - PLAN OF CARE
resolved Continue Amiodarone 200mg po daily  Apixaban 5 mg po every 12 hours  Continue the rest of the medication PTA  Moderate activity and lifestyle modifications such as low fat and no salt diet, exercise as tolerated  Follow up with cardiologist and primary physician after a week  Patient education on new medications: ELiquis and Amiodarone

## 2017-08-15 NOTE — DISCHARGE NOTE ADULT - MEDICATION SUMMARY - MEDICATIONS TO TAKE
I will START or STAY ON the medications listed below when I get home from the hospital:    spironolactone 25 mg oral tablet  -- 1 tab(s) by mouth once a day  -- Indication: For CHF (congestive heart failure)    aspirin 81 mg oral tablet, chewable  -- 1 tab(s) by mouth once a day  -- Indication: For Chronic systolic congestive heart failure    Entresto 97 mg-103 mg oral tablet  -- 1 tab(s) by mouth 2 times a day  -- Indication: For Essential hypertension    digoxin 125 mcg (0.125 mg) oral tablet  -- 1 tab(s) by mouth once a day  -- Indication: For CHF (congestive heart failure)    amiodarone 200 mg oral tablet  -- 1 tab(s) by mouth once a day  -- Avoid grapefruit and grapefruit juice while taking this medication.  Avoid prolonged or excessive exposure to direct and/or artificial sunlight while taking this medication.  Do not take this drug if you are pregnant.  It is very important that you take or use this exactly as directed.  Do not skip doses or discontinue unless directed by your doctor.  May cause drowsiness or dizziness.    -- Indication: For Atrial fibrillation    apixaban 5 mg oral tablet  -- 1 tab(s) by mouth every 12 hours MDD:10mg  -- Indication: For Atrial fibrillation    atorvastatin 20 mg oral tablet  -- 1 tab(s) by mouth once a day  -- Indication: For Hyperlipidemia    metoprolol succinate 100 mg oral tablet, extended release  -- 1 tab(s) by mouth once a day  -- Indication: For Essential hypertension    Lasix 40 mg oral tablet  -- 1 tab(s) by mouth once a day, As Needed  -- Indication: For CHF (congestive heart failure)

## 2017-08-15 NOTE — PROGRESS NOTE ADULT - SUBJECTIVE AND OBJECTIVE BOX
- Patient seen and examined.  - In summary, patient is a 56y year old man who presented with SOB, AF. (14 Aug 2017 18:29)  - Today, patient is without complaints.         *****MEDICATIONS:    MEDICATIONS  (STANDING):  heparin  Infusion.  Unit(s)/Hr (10 mL/Hr) IV Continuous <Continuous>  spironolactone 25 milliGRAM(s) Oral daily  aspirin  chewable 81 milliGRAM(s) Oral daily  sacubitril 97 mG/valsartan 103 mG 1 Tablet(s) Oral two times a day  digoxin     Tablet 0.125 milliGRAM(s) Oral daily  atorvastatin 20 milliGRAM(s) Oral at bedtime  metoprolol succinate  milliGRAM(s) Oral daily  furosemide    Tablet 40 milliGRAM(s) Oral daily    MEDICATIONS  (PRN):  heparin  Injectable 6000 Unit(s) IV Push every 6 hours PRN For aPTT less than 40           ***** REVIEW OF SYSTEM:  GEN: no fever, no chills, no pain  RESP: no SOB, no cough, no sputum  CVS: no chest pain, no palpitations, no edema  GI: no abdominal pain, no nausea, no vomiting, no constipation, no diarrhea  : no dysurea, no frequency  NEURO: no headache, no diziness  PSYCH: no depression, not anxious  Derm : no itching, no rash         ***** VITAL SIGNS:  T(F): 98.6 (08-15-17 @ 10:04), Max: 98.8 (08-14-17 @ 13:01)  HR: 93 (08-15-17 @ 10:04) (77 - 98)  BP: 133/74 (08-15-17 @ 10:04) (102/71 - 133/74)  RR: 18 (08-15-17 @ 10:04) (16 - 18)  SpO2: 98% (08-15-17 @ 10:04) (97% - 99%)  Wt(kg): --  ,   I&O's Summary    14 Aug 2017 07:01  -  15 Aug 2017 07:00  --------------------------------------------------------  IN: 580 mL / OUT: 0 mL / NET: 580 mL    15 Aug 2017 07:01  -  15 Aug 2017 10:24  --------------------------------------------------------  IN: 240 mL / OUT: 0 mL / NET: 240 mL             *****PHYSICAL EXAM:  GEN: A&O X 3 , NAD , comfortable  HEENT: NCAT, EOMI, MMM, no icterus  NECK: Supple, No JVD  CVS: S1S2 , regular , No M/R/G appreciated  PULM: CTA B/L,  no W/R/R appreciated  ABD.: soft. non tender, non distended,  bowel sounds present  Extrem: intact pulses , no edema noted  Derm: No rash or ecchymosis noted  PSYCH: normal mood, no depression, not anxious         *****LAB AND IMAGING:                        15.7   5.9   )-----------( 152      ( 15 Aug 2017 06:48 )             45.0               08-15    142  |  107  |  14  ----------------------------<  93  4.2   |  19<L>  |  1.22    Ca    9.0      15 Aug 2017 06:46  Phos  3.1     08-14  Mg     2.0     08-15    TPro  6.8  /  Alb  4.1  /  TBili  1.1  /  DBili  x   /  AST  19  /  ALT  18  /  AlkPhos  48  08-15    PT/INR - ( 14 Aug 2017 14:16 )   PT: 12.2 sec;   INR: 1.12 ratio         PTT - ( 15 Aug 2017 03:35 )  PTT:44.6 sec       CARDIAC MARKERS ( 15 Aug 2017 03:35 )  x     / <0.01 ng/mL / 279 U/L / x     / 2.2 ng/mL  CARDIAC MARKERS ( 14 Aug 2017 20:51 )  x     / <0.01 ng/mL / 297 U/L / x     / 2.2 ng/mL  CARDIAC MARKERS ( 14 Aug 2017 14:16 )  x     / <0.01 ng/mL / 312 U/L / x     / 2.2 ng/mL                [All pertinent recent Imaging/Reports reviewed]         *****A S S E S S M E N T   A N D   P L A N :    56M with acute on chronic systolic CHF, PAF  bnp mildly elevated  keep O=I  recent FLOREZ ?anginal equivalent  R+L cath this today  EPS eval for possible DCCV (would need LORE)  AC with heparin gtt (hold for cath)        __________________________  MAGDALENE. CARISSA Judge.

## 2017-08-15 NOTE — PROGRESS NOTE ADULT - SUBJECTIVE AND OBJECTIVE BOX
_____________________________________________________________________________  ========>>  M E D I C A L   A T T E N D I N G    F O L L O W  U P  N O T E  <<=========  ---------------------------------------------------------------------------------------------------------------------------------------    - Patient seen and examined by me approximately thirty minutes ago.  - In summary, patient is a 56y year old man who originally presented with new onset Afib  - Patient today overall doing ok, comfortable, eating OK. just post cath    ==================>> MEDICATIONS <<====================    MEDICATIONS  (STANDING):  heparin  Infusion.  Unit(s)/Hr (10 mL/Hr) IV Continuous <Continuous>  spironolactone 25 milliGRAM(s) Oral daily  aspirin  chewable 81 milliGRAM(s) Oral daily  sacubitril 97 mG/valsartan 103 mG 1 Tablet(s) Oral two times a day  digoxin     Tablet 0.125 milliGRAM(s) Oral daily  atorvastatin 20 milliGRAM(s) Oral at bedtime  metoprolol succinate  milliGRAM(s) Oral daily  furosemide    Tablet 40 milliGRAM(s) Oral daily    ==================>> REVIEW OF SYSTEM <<=================    GEN: no fever, no chills, no pain  RESP: no SOB, no cough, no sputum  CVS: no chest pain, no palpitations, no edema  GI: no abdominal pain, no nausea, no constipation, no diarrhea  : no dysuria, no frequency, no hematuria  Neuro: no headache, no dizziness  Derm : no itching, no rash    ==================>> VITAL SIGNS <<==================    T(C): 37 (08-15-17 @ 10:04), Max: 37.1 (08-14-17 @ 13:01)  HR: 108 (08-15-17 @ 10:50) (77 - 108)  BP: 139/87 (08-15-17 @ 10:50) (102/71 - 139/87)  RR: 18 (08-15-17 @ 10:50) (16 - 18)  SpO2: 99% (08-15-17 @ 10:50) (97% - 99%)    I&O's Summary    14 Aug 2017 07:01  -  15 Aug 2017 07:00  --------------------------------------------------------  IN: 580 mL / OUT: 0 mL / NET: 580 mL    15 Aug 2017 07:01  -  15 Aug 2017 12:45  --------------------------------------------------------  IN: 240 mL / OUT: 0 mL / NET: 240 mL    ==================>> PHYSICAL EXAM <<=================    GEN: A&O X 3 , NAD , comfortable  HEENT: NCAT, PERRL, MMM, hearing intact  Neck: supple , no JVD  CVS: S1S2 , Iregular , No M/R/G appreciated  PULM: CTA B/L,  no W/R/R appreciated  ABD.: soft. non tender, non distended,  bowel sounds present  Extrem: intact pulses , no edema   PSYCH : normal mood,  not anxious     ==================>> LAB AND IMAGING <<==================                        15.7   5.9   )-----------( 152      ( 15 Aug 2017 06:48 )             45.0        08-15    142  |  107  |  14  ----------------------------<  93  4.2   |  19<L>  |  1.22    Ca    9.0      15 Aug 2017 06:46  Phos  3.1     08-14  Mg     2.0     08-15    TPro  6.8  /  Alb  4.1  /  TBili  1.1  /  DBili  x   /  AST  19  /  ALT  18  /  AlkPhos  48  08-15    Creatinine:  1.22   (08-15 @ 06:46)  Creatinine:  1.23   (08-14 @ 14:16)  PT/INR - ( 14 Aug 2017 14:16 )   PT: 12.2 sec;   INR: 1.12 ratio         PTT - ( 15 Aug 2017 10:36 )  PTT:40.6 sec            CARDIAC MARKERS ( 15 Aug 2017 03:35 )  x     / <0.01 ng/mL / 279 U/L / x     / 2.2 ng/mL  CARDIAC MARKERS ( 14 Aug 2017 20:51 )  x     / <0.01 ng/mL / 297 U/L / x     / 2.2 ng/mL  CARDIAC MARKERS ( 14 Aug 2017 14:16 )  x     / <0.01 ng/mL / 312 U/L / x     / 2.2 ng/mL       TSH:      2.56   (08-15-17)       ,     2.04   (08-15-17)           Lipid profile:  (08-15-17)     Total: 103     LDL  : 48     HDL  :36     TG   :97     HgA1C: 5.7  (08-15-17)            < from: Cardiac Cath Lab - Adult (08.15.17 @ 11:29) >  VENTRICLES: EF estimated was 20 %.  CORONARY VESSELS: The coronary circulation is left dominant.  LM:   -- LM: Normal.  LAD:   --  LAD: Normal.  CX:   --  Circumflex: Normal.  RCA:   --  RCA: Normal.  COMPLICATIONS: There were no complications.  DIAGNOSTIC IMPRESSIONS: The coronary anatomy is normal. Left ventricular  function is abnormal.  DIAGNOSTIC RECOMMENDATIONS: Medical management is recommended.  < end of copied text >    ________________________________________________________________________  ===============>>  A S S E S S M E N T   A N D   P L A N <<===============  ------------------------------------------------------------------------------------------------------------------------------    Problem/Plan - 1:  ·  Problem: Atrial fibrillation  telemetry  cardio / EP appreciated  resume heparin drip post cath as able  EP f/u re ? cardioversion.     Problem/Plan - 2:  ·  Problem: Stable angina pectoris: likely pain due to Afib in pt with CMP / CHF  currently comfortable  no significant CAD seen on cath  plan as above.     Problem/Plan - 3:  ·  Problem: Chronic systolic congestive heart failure / nonischemic cardiomyopathy  currently stable  Continue Current medications and monitor.  cardio f/u.     Problem/Plan - 4:  ·  Problem: Essential hypertension  Continue Current medications and monitor..     Problem/Plan - 5:  ·  Problem: Other hyperlipidemia  Continue Current medications and monitor..     -GI/DVT Prophylaxis.    --------------------------------------------  Case discussed with pt, cardio  Education given on plan of care, smoking and ETOH abstinence / cessation  ___________________________  H. CARISSA York.  Pager: [phone]

## 2017-08-15 NOTE — DISCHARGE NOTE ADULT - NS AS ACTIVITY OBS
No Heavy lifting/straining/Do not drive or operate machinery/Stairs allowed/Walking-Outdoors allowed/Walking-Indoors allowed/Bathing allowed/Showering allowed

## 2017-08-16 LAB
ANION GAP SERPL CALC-SCNC: 13 MMOL/L — SIGNIFICANT CHANGE UP (ref 5–17)
BUN SERPL-MCNC: 13 MG/DL — SIGNIFICANT CHANGE UP (ref 7–23)
CALCIUM SERPL-MCNC: 8.8 MG/DL — SIGNIFICANT CHANGE UP (ref 8.4–10.5)
CHLORIDE SERPL-SCNC: 105 MMOL/L — SIGNIFICANT CHANGE UP (ref 96–108)
CO2 SERPL-SCNC: 23 MMOL/L — SIGNIFICANT CHANGE UP (ref 22–31)
CREAT SERPL-MCNC: 1.2 MG/DL — SIGNIFICANT CHANGE UP (ref 0.5–1.3)
GLUCOSE SERPL-MCNC: 111 MG/DL — HIGH (ref 70–99)
HCT VFR BLD CALC: 47.6 % — SIGNIFICANT CHANGE UP (ref 39–50)
HGB BLD-MCNC: 15.2 G/DL — SIGNIFICANT CHANGE UP (ref 13–17)
MCHC RBC-ENTMCNC: 32 GM/DL — SIGNIFICANT CHANGE UP (ref 32–36)
MCHC RBC-ENTMCNC: 32.3 PG — SIGNIFICANT CHANGE UP (ref 27–34)
MCV RBC AUTO: 101 FL — HIGH (ref 80–100)
PLATELET # BLD AUTO: 162 K/UL — SIGNIFICANT CHANGE UP (ref 150–400)
POTASSIUM SERPL-MCNC: 4.3 MMOL/L — SIGNIFICANT CHANGE UP (ref 3.5–5.3)
POTASSIUM SERPL-SCNC: 4.3 MMOL/L — SIGNIFICANT CHANGE UP (ref 3.5–5.3)
RBC # BLD: 4.71 M/UL — SIGNIFICANT CHANGE UP (ref 4.2–5.8)
RBC # FLD: 12.9 % — SIGNIFICANT CHANGE UP (ref 10.3–14.5)
SODIUM SERPL-SCNC: 141 MMOL/L — SIGNIFICANT CHANGE UP (ref 135–145)
WBC # BLD: 5.4 K/UL — SIGNIFICANT CHANGE UP (ref 3.8–10.5)
WBC # FLD AUTO: 5.4 K/UL — SIGNIFICANT CHANGE UP (ref 3.8–10.5)

## 2017-08-16 PROCEDURE — 93312 ECHO TRANSESOPHAGEAL: CPT | Mod: 26

## 2017-08-16 PROCEDURE — 93010 ELECTROCARDIOGRAM REPORT: CPT

## 2017-08-16 PROCEDURE — 93306 TTE W/DOPPLER COMPLETE: CPT | Mod: 26

## 2017-08-16 RX ADMIN — Medication 0.12 MILLIGRAM(S): at 05:48

## 2017-08-16 RX ADMIN — APIXABAN 5 MILLIGRAM(S): 2.5 TABLET, FILM COATED ORAL at 21:47

## 2017-08-16 RX ADMIN — ATORVASTATIN CALCIUM 20 MILLIGRAM(S): 80 TABLET, FILM COATED ORAL at 21:47

## 2017-08-16 RX ADMIN — AMIODARONE HYDROCHLORIDE 400 MILLIGRAM(S): 400 TABLET ORAL at 05:48

## 2017-08-16 RX ADMIN — APIXABAN 5 MILLIGRAM(S): 2.5 TABLET, FILM COATED ORAL at 05:48

## 2017-08-16 RX ADMIN — SPIRONOLACTONE 25 MILLIGRAM(S): 25 TABLET, FILM COATED ORAL at 05:49

## 2017-08-16 RX ADMIN — SACUBITRIL AND VALSARTAN 1 TABLET(S): 24; 26 TABLET, FILM COATED ORAL at 21:47

## 2017-08-16 RX ADMIN — Medication 81 MILLIGRAM(S): at 14:15

## 2017-08-16 RX ADMIN — AMIODARONE HYDROCHLORIDE 400 MILLIGRAM(S): 400 TABLET ORAL at 21:47

## 2017-08-16 RX ADMIN — AMIODARONE HYDROCHLORIDE 400 MILLIGRAM(S): 400 TABLET ORAL at 14:15

## 2017-08-16 RX ADMIN — Medication 100 MILLIGRAM(S): at 05:49

## 2017-08-16 RX ADMIN — Medication 40 MILLIGRAM(S): at 05:48

## 2017-08-16 RX ADMIN — SACUBITRIL AND VALSARTAN 1 TABLET(S): 24; 26 TABLET, FILM COATED ORAL at 05:48

## 2017-08-16 NOTE — PROGRESS NOTE ADULT - SUBJECTIVE AND OBJECTIVE BOX
- Patient seen and examined.  - In summary, patient is a 56y year old man who presented with SOB, AF. (14 Aug 2017 18:29)  - Today, patient is without complaints.         *****MEDICATIONS:    MEDICATIONS  (STANDING):  spironolactone 25 milliGRAM(s) Oral daily  aspirin  chewable 81 milliGRAM(s) Oral daily  sacubitril 97 mG/valsartan 103 mG 1 Tablet(s) Oral two times a day  digoxin     Tablet 0.125 milliGRAM(s) Oral daily  atorvastatin 20 milliGRAM(s) Oral at bedtime  metoprolol succinate  milliGRAM(s) Oral daily  furosemide    Tablet 40 milliGRAM(s) Oral daily  apixaban 5 milliGRAM(s) Oral every 12 hours  amiodarone    Tablet 400 milliGRAM(s) Oral three times a day    MEDICATIONS  (PRN):             ***** REVIEW OF SYSTEM:  GEN: no fever, no chills, no pain  RESP: no SOB, no cough, no sputum  CVS: no chest pain, no palpitations, no edema  GI: no abdominal pain, no nausea, no vomiting, no constipation, no diarrhea  : no dysurea, no frequency  NEURO: no headache, no diziness  PSYCH: no depression, not anxious  Derm : no itching, no rash         ***** VITAL SIGNS:    T(F): 97.9 (08-16-17 @ 04:03), Max: 98.6 (08-15-17 @ 10:04)  HR: 88 (08-16-17 @ 04:03) (79 - 108)  BP: 113/80 (08-16-17 @ 04:03) (104/69 - 139/87)  RR: 18 (08-16-17 @ 04:03) (18 - 18)  SpO2: 98% (08-16-17 @ 04:03) (98% - 99%)  Wt(kg): --  ,   I&O's Summary    15 Aug 2017 07:01  -  16 Aug 2017 07:00  --------------------------------------------------------  IN: 240 mL / OUT: 0 mL / NET: 240 mL                 *****PHYSICAL EXAM:  GEN: A&O X 3 , NAD , comfortable  HEENT: NCAT, EOMI, MMM, no icterus  NECK: Supple, No JVD  CVS: S1S2 , regular , No M/R/G appreciated  PULM: CTA B/L,  no W/R/R appreciated  ABD.: soft. non tender, non distended,  bowel sounds present  Extrem: intact pulses , no edema noted  Derm: No rash or ecchymosis noted  PSYCH: normal mood, no depression, not anxious         *****LAB AND IMAGING:                                 15.2   5.4   )-----------( 162      ( 16 Aug 2017 06:40 )             47.6               08-16    141  |  105  |  13  ----------------------------<  111<H>  4.3   |  23  |  1.20    Ca    8.8      16 Aug 2017 06:40  Phos  3.1     08-14  Mg     2.0     08-15    TPro  6.8  /  Alb  4.1  /  TBili  1.1  /  DBili  x   /  AST  19  /  ALT  18  /  AlkPhos  48  08-15    PT/INR - ( 14 Aug 2017 14:16 )   PT: 12.2 sec;   INR: 1.12 ratio         PTT - ( 15 Aug 2017 10:36 )  PTT:40.6 sec       CARDIAC MARKERS ( 15 Aug 2017 03:35 )  x     / <0.01 ng/mL / 279 U/L / x     / 2.2 ng/mL  CARDIAC MARKERS ( 14 Aug 2017 20:51 )  x     / <0.01 ng/mL / 297 U/L / x     / 2.2 ng/mL  CARDIAC MARKERS ( 14 Aug 2017 14:16 )  x     / <0.01 ng/mL / 312 U/L / x     / 2.2 ng/mL                    [All pertinent recent Imaging/Reports reviewed]         *****A S S E S S M E N T   A N D   P L A N :    56M with acute on chronic systolic CHF, PAF  bnp mildly elevated  keep O=I  s/p cath, NICM  EPS for LORE/DCCV  AC on eliquis  amio load per eps      __________________________  A. Alfie, D.O.

## 2017-08-16 NOTE — PROGRESS NOTE ADULT - SUBJECTIVE AND OBJECTIVE BOX
_____________________________________________________________________________  ========>>  M E D I C A L   A T T E N D I N G    F O L L O W  U P  N O T E  <<=========  ---------------------------------------------------------------------------------------------------------------------------------------    - Patient seen and examined by me approximately thirty minutes ago.  - In summary, patient is a 56y year old man who originally presented with new onset Afib  - Patient today overall doing ok, comfortable, eating OK.    ==================>> MEDICATIONS <<====================    spironolactone 25 milliGRAM(s) Oral daily  aspirin  chewable 81 milliGRAM(s) Oral daily  sacubitril 97 mG/valsartan 103 mG 1 Tablet(s) Oral two times a day  digoxin     Tablet 0.125 milliGRAM(s) Oral daily  atorvastatin 20 milliGRAM(s) Oral at bedtime  metoprolol succinate  milliGRAM(s) Oral daily  furosemide    Tablet 40 milliGRAM(s) Oral daily  apixaban 5 milliGRAM(s) Oral every 12 hours  amiodarone    Tablet 400 milliGRAM(s) Oral three times a day    ==================>> REVIEW OF SYSTEM <<=================    GEN: no fever, no chills, no pain  RESP: no SOB, no cough, no sputum  CVS: no chest pain, no palpitations, no edema  GI: no abdominal pain, no nausea, no constipation, no diarrhea  : no dysuria, no frequency, no hematuria  Neuro: no headache, no dizziness  Derm : no itching, no rash    ==================>> VITAL SIGNS <<==================    T(C): 36.6 (08-16-17 @ 04:03), Max: 36.7 (08-15-17 @ 19:50)  HR: 88 (08-16-17 @ 04:03) (79 - 108)  BP: 113/80 (08-16-17 @ 04:03) (104/69 - 139/87)  RR: 18 (08-16-17 @ 04:03) (18 - 18)  SpO2: 98% (08-16-17 @ 04:03) (98% - 99%)      I&O's Summary    15 Aug 2017 07:01  -  16 Aug 2017 07:00  --------------------------------------------------------  IN: 240 mL / OUT: 0 mL / NET: 240 mL    16 Aug 2017 07:01  -  16 Aug 2017 10:21  --------------------------------------------------------  IN: 0 mL / OUT: 0 mL / NET: 0 mL    ==================>> PHYSICAL EXAM <<=================    GEN: A&O X 3 , NAD , comfortable  HEENT: NCAT, PERRL, MMM, hearing intact  Neck: supple , no JVD  CVS: S1S2 , Iregular , No M/R/G appreciated  PULM: CTA B/L,  no W/R/R appreciated  ABD.: soft. non tender, non distended,  bowel sounds present  Extrem: intact pulses , no edema   PSYCH : normal mood,  not anxious     ==================>> LAB AND IMAGING <<==================                                    15.2   5.4   )-----------( 162      ( 16 Aug 2017 06:40 )             47.6        08-16    141  |  105  |  13  ----------------------------<  111<H>  4.3   |  23  |  1.20    Ca    8.8      16 Aug 2017 06:40  Phos  3.1     08-14  Mg     2.0     08-15    TPro  6.8  /  Alb  4.1  /  TBili  1.1  /  DBili  x   /  AST  19  /  ALT  18  /  AlkPhos  48  08-15    < from: Cardiac Cath Lab - Adult (08.15.17 @ 11:29) >  VENTRICLES: EF estimated was 20 %.  CORONARY VESSELS: The coronary circulation is left dominant.  LM:   -- LM: Normal.  LAD:   --  LAD: Normal.  CX:   --  Circumflex: Normal.  RCA:   --  RCA: Normal.  COMPLICATIONS: There were no complications.  DIAGNOSTIC IMPRESSIONS: The coronary anatomy is normal. Left ventricular  function is abnormal.  DIAGNOSTIC RECOMMENDATIONS: Medical management is recommended.  < end of copied text >    ________________________________________________________________________  ===============>>  A S S E S S M E N T   A N D   P L A N <<==============  ------------------------------------------------------------------------------------------------------------------------------    Problem/Plan - 1:  ·  Problem: Atrial fibrillation  telemetry  cardio / EP appreciated  started on Eliquis  started on Amio load  ?eventual cardioversion.     Problem/Plan - 2:  ·  Problem: Stable angina pectoris: likely pain due to Afib in pt with CMP / CHF  currently comfortable  no significant CAD seen on cath  plan as above.     Problem/Plan - 3:  ·  Problem: Chronic systolic congestive heart failure / nonischemic cardiomyopathy  currently stable  Continue Current medications and monitor.  cardio f/u: med optimization     Problem/Plan - 4:  ·  Problem: Essential hypertension  Continue Current medications and monitor..     Problem/Plan - 5:  ·  Problem: Other hyperlipidemia  Continue Current medications and monitor..     -GI/DVT Prophylaxis.    --------------------------------------------  Case discussed with pt, cardio  Education given on plan of care, smoking and ETOH abstinence / cessation  ___________________________  H. CARISSA York.  Pager: [phone]

## 2017-08-17 LAB
ANION GAP SERPL CALC-SCNC: 15 MMOL/L — SIGNIFICANT CHANGE UP (ref 5–17)
BUN SERPL-MCNC: 14 MG/DL — SIGNIFICANT CHANGE UP (ref 7–23)
CALCIUM SERPL-MCNC: 8.9 MG/DL — SIGNIFICANT CHANGE UP (ref 8.4–10.5)
CHLORIDE SERPL-SCNC: 104 MMOL/L — SIGNIFICANT CHANGE UP (ref 96–108)
CO2 SERPL-SCNC: 21 MMOL/L — LOW (ref 22–31)
CREAT SERPL-MCNC: 1.19 MG/DL — SIGNIFICANT CHANGE UP (ref 0.5–1.3)
GLUCOSE SERPL-MCNC: 94 MG/DL — SIGNIFICANT CHANGE UP (ref 70–99)
HCT VFR BLD CALC: 46.8 % — SIGNIFICANT CHANGE UP (ref 39–50)
HGB BLD-MCNC: 15.3 G/DL — SIGNIFICANT CHANGE UP (ref 13–17)
MAGNESIUM SERPL-MCNC: 2.2 MG/DL — SIGNIFICANT CHANGE UP (ref 1.6–2.6)
MCHC RBC-ENTMCNC: 31.4 PG — SIGNIFICANT CHANGE UP (ref 27–34)
MCHC RBC-ENTMCNC: 32.7 GM/DL — SIGNIFICANT CHANGE UP (ref 32–36)
MCV RBC AUTO: 95.9 FL — SIGNIFICANT CHANGE UP (ref 80–100)
PLATELET # BLD AUTO: 162 K/UL — SIGNIFICANT CHANGE UP (ref 150–400)
POTASSIUM SERPL-MCNC: 4.6 MMOL/L — SIGNIFICANT CHANGE UP (ref 3.5–5.3)
POTASSIUM SERPL-SCNC: 4.6 MMOL/L — SIGNIFICANT CHANGE UP (ref 3.5–5.3)
RBC # BLD: 4.88 M/UL — SIGNIFICANT CHANGE UP (ref 4.2–5.8)
RBC # FLD: 14.6 % — HIGH (ref 10.3–14.5)
SODIUM SERPL-SCNC: 140 MMOL/L — SIGNIFICANT CHANGE UP (ref 135–145)
WBC # BLD: 5.61 K/UL — SIGNIFICANT CHANGE UP (ref 3.8–10.5)
WBC # FLD AUTO: 5.61 K/UL — SIGNIFICANT CHANGE UP (ref 3.8–10.5)

## 2017-08-17 PROCEDURE — 93010 ELECTROCARDIOGRAM REPORT: CPT

## 2017-08-17 RX ADMIN — Medication 100 MILLIGRAM(S): at 05:58

## 2017-08-17 RX ADMIN — SACUBITRIL AND VALSARTAN 1 TABLET(S): 24; 26 TABLET, FILM COATED ORAL at 17:47

## 2017-08-17 RX ADMIN — Medication 81 MILLIGRAM(S): at 17:46

## 2017-08-17 RX ADMIN — Medication 0.12 MILLIGRAM(S): at 05:58

## 2017-08-17 RX ADMIN — Medication 40 MILLIGRAM(S): at 05:58

## 2017-08-17 RX ADMIN — APIXABAN 5 MILLIGRAM(S): 2.5 TABLET, FILM COATED ORAL at 17:46

## 2017-08-17 RX ADMIN — AMIODARONE HYDROCHLORIDE 400 MILLIGRAM(S): 400 TABLET ORAL at 21:03

## 2017-08-17 RX ADMIN — APIXABAN 5 MILLIGRAM(S): 2.5 TABLET, FILM COATED ORAL at 05:58

## 2017-08-17 RX ADMIN — SACUBITRIL AND VALSARTAN 1 TABLET(S): 24; 26 TABLET, FILM COATED ORAL at 05:58

## 2017-08-17 RX ADMIN — SPIRONOLACTONE 25 MILLIGRAM(S): 25 TABLET, FILM COATED ORAL at 05:58

## 2017-08-17 RX ADMIN — ATORVASTATIN CALCIUM 20 MILLIGRAM(S): 80 TABLET, FILM COATED ORAL at 21:03

## 2017-08-17 RX ADMIN — AMIODARONE HYDROCHLORIDE 400 MILLIGRAM(S): 400 TABLET ORAL at 05:58

## 2017-08-17 NOTE — PROGRESS NOTE ADULT - SUBJECTIVE AND OBJECTIVE BOX
- Patient seen and examined.  - In summary, patient is a 56y year old man who presented with SOB, AF. (14 Aug 2017 18:29)  - Today, patient is without complaints.         *****MEDICATIONS:    MEDICATIONS  (STANDING):  spironolactone 25 milliGRAM(s) Oral daily  aspirin  chewable 81 milliGRAM(s) Oral daily  sacubitril 97 mG/valsartan 103 mG 1 Tablet(s) Oral two times a day  digoxin     Tablet 0.125 milliGRAM(s) Oral daily  atorvastatin 20 milliGRAM(s) Oral at bedtime  metoprolol succinate  milliGRAM(s) Oral daily  furosemide    Tablet 40 milliGRAM(s) Oral daily  apixaban 5 milliGRAM(s) Oral every 12 hours  amiodarone    Tablet 400 milliGRAM(s) Oral three times a day    MEDICATIONS  (PRN):               ***** REVIEW OF SYSTEM:  GEN: no fever, no chills, no pain  RESP: no SOB, no cough, no sputum  CVS: no chest pain, no palpitations, no edema  GI: no abdominal pain, no nausea, no vomiting, no constipation, no diarrhea  : no dysurea, no frequency  NEURO: no headache, no diziness  PSYCH: no depression, not anxious  Derm : no itching, no rash         ***** VITAL SIGNS:    T(F): 97.7 (08-17-17 @ 04:27), Max: 98.3 (08-16-17 @ 20:49)  HR: 63 (08-17-17 @ 04:27) (54 - 63)  BP: 109/71 (08-17-17 @ 04:27) (109/71 - 128/84)  RR: 18 (08-17-17 @ 04:27) (18 - 18)  SpO2: 98% (08-17-17 @ 04:27) (95% - 98%)  Wt(kg): --  ,   I&O's Summary    16 Aug 2017 07:01  -  17 Aug 2017 07:00  --------------------------------------------------------  IN: 0 mL / OUT: 900 mL / NET: -900 mL                   *****PHYSICAL EXAM:  GEN: A&O X 3 , NAD , comfortable  HEENT: NCAT, EOMI, MMM, no icterus  NECK: Supple, No JVD  CVS: S1S2 , regular , No M/R/G appreciated  PULM: CTA B/L,  no W/R/R appreciated  ABD.: soft. non tender, non distended,  bowel sounds present  Extrem: intact pulses , no edema noted  Derm: No rash or ecchymosis noted  PSYCH: normal mood, no depression, not anxious         *****LAB AND IMAGING:                                     15.2   5.4   )-----------( 162      ( 16 Aug 2017 06:40 )             47.6               08-16    141  |  105  |  13  ----------------------------<  111<H>  4.3   |  23  |  1.20    Ca    8.8      16 Aug 2017 06:40      PTT - ( 15 Aug 2017 10:36 )  PTT:40.6 sec                           [All pertinent recent Imaging/Reports reviewed]         *****A S S E S S M E N T   A N D   P L A N :    56M with acute on chronic systolic CHF, PAF  bnp mildly elevated  keep O=I  s/p cath, NICM  no clot on LORE  Pt told by EP that DCCV will be 8/18  NPO after MN  AC on eliquis  getting amio load       __________________________  LIDA Judge D.O.

## 2017-08-17 NOTE — PROGRESS NOTE ADULT - SUBJECTIVE AND OBJECTIVE BOX
_____________________________________________________________________________  ========>>  M E D I C A L   A T T E N D I N G    F O L L O W  U P  N O T E  <<=========  ---------------------------------------------------------------------------------------------------------------------------------------    - Patient seen and examined by me approximately thirty minutes ago.  - In summary, patient is a 56y year old man who originally presented with new onset Afib  - Patient today overall doing ok, comfortable, eating OK. no CP?SOB    ==================>> MEDICATIONS <<====================    spironolactone 25 milliGRAM(s) Oral daily  aspirin  chewable 81 milliGRAM(s) Oral daily  sacubitril 97 mG/valsartan 103 mG 1 Tablet(s) Oral two times a day  digoxin     Tablet 0.125 milliGRAM(s) Oral daily  atorvastatin 20 milliGRAM(s) Oral at bedtime  metoprolol succinate  milliGRAM(s) Oral daily  furosemide    Tablet 40 milliGRAM(s) Oral daily  apixaban 5 milliGRAM(s) Oral every 12 hours  amiodarone    Tablet 400 milliGRAM(s) Oral three times a day    ==================>> REVIEW OF SYSTEM <<=================    GEN: no fever, no chills, no pain  RESP: no SOB, no cough, no sputum  CVS: no chest pain, no palpitations, no edema  GI: no abdominal pain, no nausea, no constipation, no diarrhea  : no dysuria, no frequency, no hematuria  Neuro: no headache, no dizziness  Derm : no itching, no rash    ==================>> VITAL SIGNS <<==================    Vital Signs Last 24 Hrs  T(C): 36.7 (08-17-17 @ 11:42)  T(F): 98.1 (08-17-17 @ 11:42), Max: 98.3 (08-16-17 @ 20:49)  HR: 64 (08-17-17 @ 11:42) (62 - 64)  BP: 119/82 (08-17-17 @ 11:42)  BP(mean): --  RR: 17 (08-17-17 @ 11:42) (17 - 18)  SpO2: 94% (08-17-17 @ 11:42) (94% - 98%)      ==================>> PHYSICAL EXAM <<=================    GEN: A&O X 3 , NAD , comfortable  HEENT: NCAT, PERRL, MMM, hearing intact  Neck: supple , no JVD  CVS: S1S2 , Iregular , No M/R/G appreciated  PULM: CTA B/L,  no W/R/R appreciated  ABD.: soft. non tender, non distended,  bowel sounds present  Extrem: intact pulses , no edema   PSYCH : normal mood,  not anxious     ==================>> LAB AND IMAGING <<==================                                            15.3   5.61  )-----------( 162      ( 17 Aug 2017 08:38 )             46.8        08-17    140  |  104  |  14  ----------------------------<  94  4.6   |  21<L>  |  1.19    Ca    8.9      17 Aug 2017 08:38  Mg     2.2     08-17      Creatinine:  1.19   (08-17 @ 08:38)  Creatinine:  1.20   (08-16 @ 06:40)  Creatinine:  1.22   (08-15 @ 06:46)  Creatinine:  1.23   (08-14 @ 14:16)                   TSH:      2.56   (08-15-17)       ,     2.04   (08-15-17)           Lipid profile:  (08-15-17)     Total: 103     LDL  : 48     HDL  :36     TG   :97     HgA1C: 5.7  (08-15-17)            < from: Transesophageal Echocardiogram (08.16.17 @ 16:08) >  Conclusions:  1. Tethered mitral valve leaflets with normal opening.  Moderate-severe mitral regurgitation.  2. Moderately dilated left atrium.  LA volume index = 47  cc/m2. No left atrial or left atrial appendage thrombus.  3. Eccentric left ventricular hypertrophy (dilated left  ventricle with normal relative wall thickness).  4. Severe global left ventricular systolic dysfunction.  5. Severe right atrial enlargement. A device wire is noted  in the right heart.  6. The right ventricle is not well visualized; grossly  reduced  right ventricular systolic function.  < end of copied text >    ________________________________________________________________________  ===============>>  A S S E S S M E N T   A N D   P L A N <<==============  ------------------------------------------------------------------------------------------------------------------------------    Problem/Plan - 1:  ·  Problem: Atrial fibrillation  telemetry  cardio / EP appreciated  started on Eliquis  started on Amio load  plan for eventual cardioversion if not chemically cardioverted)    Problem/Plan - 2:  ·  Problem: Stable angina pectoris: likely pain due to Afib in pt with CMP / CHF  currently comfortable  no significant CAD seen on cath  plan as above.     Problem/Plan - 3:  ·  Problem: Chronic systolic congestive heart failure / nonischemic cardiomyopathy  currently stable  Continue Current medications and monitor.  cardio f/u: med optimization     Problem/Plan - 4:  ·  Problem: Essential hypertension  Continue Current medications and monitor..     Problem/Plan - 5:  ·  Problem: Other hyperlipidemia  Continue Current medications and monitor..     -GI/DVT Prophylaxis.    --------------------------------------------  Case discussed with pt, cardio  Education given on plan of care, OOB, ambulate  ___________________________  H. CARISSA York.  Pager: [phone]

## 2017-08-17 NOTE — PROGRESS NOTE ADULT - SUBJECTIVE AND OBJECTIVE BOX
E L E C T R O  P H Y S I O L O G Y     PROGRESS  NOTE   ________________________________________________    CHIEF COMPLAINT:Patient is a 56y old  Male who presents with a chief complaint new onset a.fib.    	  REVIEW OF SYSTEMS:  CONSTITUTIONAL: No fever, weight loss, or fatigue  EYES: No eye pain, visual disturbances, or discharge  ENT:  No difficulty hearing, tinnitus, vertigo; No sinus or throat pain  NECK: No pain or stiffness  RESPIRATORY: No cough, wheezing, chills or hemoptysis; + Shortness of Breath  CARDIOVASCULAR: No chest pain, palpitations, passing out, dizziness, or leg swelling  GASTROINTESTINAL: No abdominal or epigastric pain. No nausea, vomiting, or hematemesis; No diarrhea or constipation. No melena or hematochezia.  GENITOURINARY: No dysuria, frequency, hematuria, or incontinence  NEUROLOGICAL: No headaches, memory loss, loss of strength, numbness, or tremors  SKIN: No itching, burning, rashes, or lesions   LYMPH Nodes: No enlarged glands  ENDOCRINE: No heat or cold intolerance; No hair loss  MUSCULOSKELETAL: No joint pain or swelling; No muscle, back, or extremity pain  PSYCHIATRIC: No depression, anxiety, mood swings, or difficulty sleeping  HEME/LYMPH: No easy bruising, or bleeding gums  ALLERGY AND IMMUNOLOGIC: No hives or eczema	    [ ] All others negative	  [ ] Unable to obtain    PHYSICAL EXAM:  T(C): 36.7 (08-17-17 @ 11:42), Max: 36.8 (08-16-17 @ 20:49)  HR: 64 (08-17-17 @ 11:42) (62 - 64)  BP: 119/82 (08-17-17 @ 11:42) (109/71 - 128/84)  RR: 17 (08-17-17 @ 11:42) (17 - 18)  SpO2: 94% (08-17-17 @ 11:42) (94% - 98%)  Wt(kg): --  I&O's Summary    16 Aug 2017 07:01  -  17 Aug 2017 07:00  --------------------------------------------------------  IN: 0 mL / OUT: 900 mL / NET: -900 mL    17 Aug 2017 07:01  -  17 Aug 2017 16:00  --------------------------------------------------------  IN: 1060 mL / OUT: 0 mL / NET: 1060 mL        Appearance: Normal	  HEENT:   Normal oral mucosa, PERRL, EOMI	  Lymphatic: No lymphadenopathy  Cardiovascular: Normal S1 S2, No JVD, No murmurs, No edema  Respiratory: Lungs clear to auscultation	  Psychiatry: A & O x 3, Mood & affect appropriate  Gastrointestinal:  Soft, Non-tender, + BS	  Skin: No rashes, No ecchymoses, No cyanosis	  Neurologic: Non-focal  Extremities: Normal range of motion, No clubbing, cyanosis or edema  Vascular: Peripheral pulses palpable 2+ bilaterally    MEDICATIONS  (STANDING):  spironolactone 25 milliGRAM(s) Oral daily  aspirin  chewable 81 milliGRAM(s) Oral daily  sacubitril 97 mG/valsartan 103 mG 1 Tablet(s) Oral two times a day  digoxin     Tablet 0.125 milliGRAM(s) Oral daily  atorvastatin 20 milliGRAM(s) Oral at bedtime  metoprolol succinate  milliGRAM(s) Oral daily  furosemide    Tablet 40 milliGRAM(s) Oral daily  apixaban 5 milliGRAM(s) Oral every 12 hours  amiodarone    Tablet 400 milliGRAM(s) Oral three times a day      TELEMETRY: 	    ECG:  	  RADIOLOGY:  OTHER: 	  	  LABS:	 	    CARDIAC MARKERS:                                15.3   5.61  )-----------( 162      ( 17 Aug 2017 08:38 )             46.8     08-17    140  |  104  |  14  ----------------------------<  94  4.6   |  21<L>  |  1.19    Ca    8.9      17 Aug 2017 08:38  Mg     2.2     08-17      proBNP: Serum Pro-Brain Natriuretic Peptide: 667 pg/mL (08-14 @ 14:16)    Lipid Profile: Cholesterol 103  LDL 48  HDL 36  TG 97    HgA1c: Hemoglobin A1C, Whole Blood: 5.7 % (08-15 @ 08:39)    TSH: Thyroid Stimulating Hormone, Serum: 2.56 uIU/mL (08-15 @ 08:41)  Thyroid Stimulating Hormone, Serum: 2.04 uIU/mL (08-15 @ 01:37)

## 2017-08-17 NOTE — PROGRESS NOTE ADULT - ASSESSMENT
pt with new onset a.fib  gianluca neg for clots.  cardioversion via AICD  continue amio loading  continue elequis.

## 2017-08-18 LAB
ANION GAP SERPL CALC-SCNC: 14 MMOL/L — SIGNIFICANT CHANGE UP (ref 5–17)
BUN SERPL-MCNC: 18 MG/DL — SIGNIFICANT CHANGE UP (ref 7–23)
CALCIUM SERPL-MCNC: 9.1 MG/DL — SIGNIFICANT CHANGE UP (ref 8.4–10.5)
CHLORIDE SERPL-SCNC: 104 MMOL/L — SIGNIFICANT CHANGE UP (ref 96–108)
CO2 SERPL-SCNC: 22 MMOL/L — SIGNIFICANT CHANGE UP (ref 22–31)
CREAT SERPL-MCNC: 1.33 MG/DL — HIGH (ref 0.5–1.3)
GLUCOSE SERPL-MCNC: 102 MG/DL — HIGH (ref 70–99)
HCT VFR BLD CALC: 48.1 % — SIGNIFICANT CHANGE UP (ref 39–50)
HGB BLD-MCNC: 15.8 G/DL — SIGNIFICANT CHANGE UP (ref 13–17)
MAGNESIUM SERPL-MCNC: 2.1 MG/DL — SIGNIFICANT CHANGE UP (ref 1.6–2.6)
MCHC RBC-ENTMCNC: 32.9 GM/DL — SIGNIFICANT CHANGE UP (ref 32–36)
MCHC RBC-ENTMCNC: 33 PG — SIGNIFICANT CHANGE UP (ref 27–34)
MCV RBC AUTO: 100 FL — SIGNIFICANT CHANGE UP (ref 80–100)
PHOSPHATE SERPL-MCNC: 2.8 MG/DL — SIGNIFICANT CHANGE UP (ref 2.5–4.5)
PLATELET # BLD AUTO: 158 K/UL — SIGNIFICANT CHANGE UP (ref 150–400)
POTASSIUM SERPL-MCNC: 4.2 MMOL/L — SIGNIFICANT CHANGE UP (ref 3.5–5.3)
POTASSIUM SERPL-SCNC: 4.2 MMOL/L — SIGNIFICANT CHANGE UP (ref 3.5–5.3)
RBC # BLD: 4.79 M/UL — SIGNIFICANT CHANGE UP (ref 4.2–5.8)
RBC # FLD: 12.9 % — SIGNIFICANT CHANGE UP (ref 10.3–14.5)
SODIUM SERPL-SCNC: 140 MMOL/L — SIGNIFICANT CHANGE UP (ref 135–145)
WBC # BLD: 6.2 K/UL — SIGNIFICANT CHANGE UP (ref 3.8–10.5)
WBC # FLD AUTO: 6.2 K/UL — SIGNIFICANT CHANGE UP (ref 3.8–10.5)

## 2017-08-18 RX ADMIN — SACUBITRIL AND VALSARTAN 1 TABLET(S): 24; 26 TABLET, FILM COATED ORAL at 18:03

## 2017-08-18 RX ADMIN — APIXABAN 5 MILLIGRAM(S): 2.5 TABLET, FILM COATED ORAL at 18:03

## 2017-08-18 RX ADMIN — SACUBITRIL AND VALSARTAN 1 TABLET(S): 24; 26 TABLET, FILM COATED ORAL at 05:19

## 2017-08-18 RX ADMIN — ATORVASTATIN CALCIUM 20 MILLIGRAM(S): 80 TABLET, FILM COATED ORAL at 21:50

## 2017-08-18 RX ADMIN — AMIODARONE HYDROCHLORIDE 400 MILLIGRAM(S): 400 TABLET ORAL at 05:19

## 2017-08-18 RX ADMIN — Medication 100 MILLIGRAM(S): at 05:22

## 2017-08-18 RX ADMIN — AMIODARONE HYDROCHLORIDE 400 MILLIGRAM(S): 400 TABLET ORAL at 13:19

## 2017-08-18 RX ADMIN — SPIRONOLACTONE 25 MILLIGRAM(S): 25 TABLET, FILM COATED ORAL at 05:19

## 2017-08-18 RX ADMIN — Medication 0.12 MILLIGRAM(S): at 05:19

## 2017-08-18 RX ADMIN — Medication 81 MILLIGRAM(S): at 13:19

## 2017-08-18 RX ADMIN — AMIODARONE HYDROCHLORIDE 400 MILLIGRAM(S): 400 TABLET ORAL at 21:50

## 2017-08-18 RX ADMIN — APIXABAN 5 MILLIGRAM(S): 2.5 TABLET, FILM COATED ORAL at 05:19

## 2017-08-18 RX ADMIN — Medication 40 MILLIGRAM(S): at 05:19

## 2017-08-18 NOTE — PROGRESS NOTE ADULT - SUBJECTIVE AND OBJECTIVE BOX
- Patient seen and examined.  - In summary, patient is a 56y year old man who presented with SOB, AF. (14 Aug 2017 18:29)  - Today, patient is without complaints.         *****MEDICATIONS:    MEDICATIONS  (STANDING):  spironolactone 25 milliGRAM(s) Oral daily  aspirin  chewable 81 milliGRAM(s) Oral daily  sacubitril 97 mG/valsartan 103 mG 1 Tablet(s) Oral two times a day  digoxin     Tablet 0.125 milliGRAM(s) Oral daily  atorvastatin 20 milliGRAM(s) Oral at bedtime  metoprolol succinate  milliGRAM(s) Oral daily  furosemide    Tablet 40 milliGRAM(s) Oral daily  apixaban 5 milliGRAM(s) Oral every 12 hours  amiodarone    Tablet 400 milliGRAM(s) Oral three times a day    MEDICATIONS  (PRN):               ***** REVIEW OF SYSTEM:  GEN: no fever, no chills, no pain  RESP: no SOB, no cough, no sputum  CVS: no chest pain, no palpitations, no edema  GI: no abdominal pain, no nausea, no vomiting, no constipation, no diarrhea  : no dysurea, no frequency  NEURO: no headache, no diziness  PSYCH: no depression, not anxious  Derm : no itching, no rash         ***** VITAL SIGNS:    T(F): 97.9 (08-17-17 @ 20:57), Max: 98.6 (08-17-17 @ 17:44)  HR: 78 (08-18-17 @ 01:59) (52 - 78)  BP: 133/88 (08-18-17 @ 01:59) (119/82 - 133/88)  RR: 18 (08-17-17 @ 20:57) (17 - 19)  SpO2: 98% (08-17-17 @ 20:57) (94% - 99%)  Wt(kg): --  ,   I&O's Summary    17 Aug 2017 07:01  -  18 Aug 2017 07:00  --------------------------------------------------------  IN: 1300 mL / OUT: 0 mL / NET: 1300 mL                   *****PHYSICAL EXAM:  GEN: A&O X 3 , NAD , comfortable  HEENT: NCAT, EOMI, MMM, no icterus  NECK: Supple, No JVD  CVS: S1S2 , regular , No M/R/G appreciated  PULM: CTA B/L,  no W/R/R appreciated  ABD.: soft. non tender, non distended,  bowel sounds present  Extrem: intact pulses , no edema noted  Derm: No rash or ecchymosis noted  PSYCH: normal mood, no depression, not anxious         *****LAB AND IMAGING:                                                15.3   5.61  )-----------( 162      ( 17 Aug 2017 08:38 )             46.8               08-17    140  |  104  |  14  ----------------------------<  94  4.6   |  21<L>  |  1.19    Ca    8.9      17 Aug 2017 08:38  Mg     2.2     08-17        [All pertinent recent Imaging/Reports reviewed]         *****A S S E S S M E N T   A N D   P L A N :    56M with acute on chronic systolic CHF, PAF  keep O=I  s/p cath, NICM  no clot on LORE  s/p DCCV  tele NSR w PVC  AC on eliquis  amio load   f/u EPS re: DCP    __________________________  LIDA Judge D.O.

## 2017-08-18 NOTE — PROGRESS NOTE ADULT - SUBJECTIVE AND OBJECTIVE BOX
_____________________________________________________________________________  ========>>  M E D I C A L   A T T E N D I N G    F O L L O W  U P  N O T E  <<=========  ---------------------------------------------------------------------------------------------------------------------------------------    - Patient seen and examined by me approximately thirty minutes ago.  - In summary, patient is a 56y year old man who originally presented with new onset Afib  - Patient today overall doing ok, comfortable, eating OK. no issues post cardioversion    ==================>> MEDICATIONS <<====================    spironolactone 25 milliGRAM(s) Oral daily  aspirin  chewable 81 milliGRAM(s) Oral daily  sacubitril 97 mG/valsartan 103 mG 1 Tablet(s) Oral two times a day  digoxin     Tablet 0.125 milliGRAM(s) Oral daily  atorvastatin 20 milliGRAM(s) Oral at bedtime  metoprolol succinate  milliGRAM(s) Oral daily  furosemide    Tablet 40 milliGRAM(s) Oral daily  apixaban 5 milliGRAM(s) Oral every 12 hours  amiodarone    Tablet 400 milliGRAM(s) Oral three times a day    ==================>> REVIEW OF SYSTEM <<=================    GEN: no fever, no chills, no pain  RESP: no SOB, no cough, no sputum  CVS: no chest pain, no palpitations, no edema  GI: no abdominal pain, no nausea, no constipation, no diarrhea  : no dysuria, no frequency, no hematuria  Neuro: no headache, no dizziness  Derm : no itching, no rash    ==================>> VITAL SIGNS <<==================    Vital Signs Last 24 Hrs  T(C): 36.8 (08-18-17 @ 11:57)  T(F): 98.2 (08-18-17 @ 11:57), Max: 98.6 (08-17-17 @ 17:44)  HR: 69 (08-18-17 @ 11:57) (52 - 78)  BP: 124/79 (08-18-17 @ 11:57)  BP(mean): --  RR: 18 (08-18-17 @ 11:57) (18 - 19)  SpO2: 96% (08-18-17 @ 11:57) (96% - 99%)      ==================>> PHYSICAL EXAM <<=================    GEN: A&O X 3 , NAD , comfortable  HEENT: NCAT, PERRL, MMM, hearing intact  Neck: supple , no JVD  CVS: S1S2 , Iregular , No M/R/G appreciated  PULM: CTA B/L,  no W/R/R appreciated  ABD.: soft. non tender, non distended,  bowel sounds present  Extrem: intact pulses , no edema   PSYCH : normal mood,  not anxious     ==================>> LAB AND IMAGING <<==================                                         15.8   6.2   )-----------( 158      ( 18 Aug 2017 10:45 )             48.1        08-18    140  |  104  |  18  ----------------------------<  102<H>  4.2   |  22  |  1.33<H>    Ca    9.1      18 Aug 2017 10:46  Phos  2.8     08-18  Mg     2.1     08-18      < from: Transesophageal Echocardiogram (08.16.17 @ 16:08) >  Conclusions:  1. Tethered mitral valve leaflets with normal opening.  Moderate-severe mitral regurgitation.  2. Moderately dilated left atrium.  LA volume index = 47  cc/m2. No left atrial or left atrial appendage thrombus.  3. Eccentric left ventricular hypertrophy (dilated left  ventricle with normal relative wall thickness).  4. Severe global left ventricular systolic dysfunction.  5. Severe right atrial enlargement. A device wire is noted  in the right heart.  6. The right ventricle is not well visualized; grossly  reduced  right ventricular systolic function.  < end of copied text >    ________________________________________________________________________  ===============>>  A S S E S S M E N T   A N D   P L A N <<==============  ------------------------------------------------------------------------------------------------------------------------------    Problem/Plan - 1:  ·  Problem: Atrial fibrillation  telemetry  cardio / EP appreciated  continue Eliquis  finishing Amio load in AM    Problem/Plan - 2:  ·  Problem: Stable angina pectoris: likely pain due to Afib in pt with CMP / CHF  currently comfortable  no significant CAD seen on cath  plan as above.     Problem/Plan - 3:  ·  Problem: Chronic systolic congestive heart failure / nonischemic cardiomyopathy  currently stable  Continue Current medications and monitor.  cardio f/u: med optimization     Problem/Plan - 4:  ·  Problem: Essential hypertension  Continue Current medications and monitor..     Problem/Plan - 5:  ·  Problem: Other hyperlipidemia  Continue Current medications and monitor..     -GI/DVT Prophylaxis.  -possible DC planing home tomorrow if stable  --------------------------------------------  Case discussed with pt, cardio  Education given on plan of care, OOB, ambulate  ___________________________  H. CARISSA York.  Pager: [phone]

## 2017-08-18 NOTE — PROVIDER CONTACT NOTE (OTHER) - ACTION/TREATMENT ORDERED:
AUGUSTA Arenas made aware. Will order labs for this am. Safety maintained. Will continue to monitor.

## 2017-08-18 NOTE — PROGRESS NOTE ADULT - ASSESSMENT
pt s/p cardioversion  continue amio load   may change amio to 200 mg daily in am for possible dc  continue anticoagulation.

## 2017-08-18 NOTE — PROGRESS NOTE ADULT - SUBJECTIVE AND OBJECTIVE BOX
E L E C T R O  P H Y S I O L O G Y     PROGRESS  NOTE   ________________________________________________    CHIEF COMPLAINT:Patient is a 56y old  Male who presents with a chief complaint of Dr Euceda sent me to hospital (15 Aug 2017 16:19)  with new onset a.fib.  	  REVIEW OF SYSTEMS:  CONSTITUTIONAL: No fever, weight loss, or fatigue  EYES: No eye pain, visual disturbances, or discharge  ENT:  No difficulty hearing, tinnitus, vertigo; No sinus or throat pain  NECK: No pain or stiffness  RESPIRATORY: No cough, wheezing, chills or hemoptysis;  decrease Shortness of Breath  CARDIOVASCULAR: No chest pain, palpitations, passing out, dizziness, or leg swelling  GASTROINTESTINAL: No abdominal or epigastric pain. No nausea, vomiting, or hematemesis; No diarrhea or constipation. No melena or hematochezia.  GENITOURINARY: No dysuria, frequency, hematuria, or incontinence  NEUROLOGICAL: No headaches, memory loss, loss of strength, numbness, or tremors  SKIN: No itching, burning, rashes, or lesions   LYMPH Nodes: No enlarged glands  ENDOCRINE: No heat or cold intolerance; No hair loss  MUSCULOSKELETAL: No joint pain or swelling; No muscle, back, or extremity pain  PSYCHIATRIC: No depression, anxiety, mood swings, or difficulty sleeping  HEME/LYMPH: No easy bruising, or bleeding gums  ALLERGY AND IMMUNOLOGIC: No hives or eczema	    [ ] All others negative	  [ ] Unable to obtain    PHYSICAL EXAM:  T(C): 36.6 (08-17-17 @ 20:57), Max: 37 (08-17-17 @ 17:44)  HR: 78 (08-18-17 @ 01:59) (52 - 78)  BP: 133/88 (08-18-17 @ 01:59) (119/82 - 133/88)  RR: 18 (08-17-17 @ 20:57) (17 - 19)  SpO2: 98% (08-17-17 @ 20:57) (94% - 99%)  Wt(kg): --  I&O's Summary    17 Aug 2017 07:01  -  18 Aug 2017 07:00  --------------------------------------------------------  IN: 1300 mL / OUT: 0 mL / NET: 1300 mL        Appearance: Normal	  HEENT:   Normal oral mucosa, PERRL, EOMI	  Lymphatic: No lymphadenopathy  Cardiovascular: Normal S1 S2, No JVD, No murmurs, No edema  Respiratory: Lungs clear to auscultation	  Psychiatry: A & O x 3, Mood & affect appropriate  Gastrointestinal:  Soft, Non-tender, + BS	  Skin: No rashes, No ecchymoses, No cyanosis	  Neurologic: Non-focal  Extremities: Normal range of motion, No clubbing, cyanosis or edema  Vascular: Peripheral pulses palpable 2+ bilaterally    MEDICATIONS  (STANDING):  spironolactone 25 milliGRAM(s) Oral daily  aspirin  chewable 81 milliGRAM(s) Oral daily  sacubitril 97 mG/valsartan 103 mG 1 Tablet(s) Oral two times a day  digoxin     Tablet 0.125 milliGRAM(s) Oral daily  atorvastatin 20 milliGRAM(s) Oral at bedtime  metoprolol succinate  milliGRAM(s) Oral daily  furosemide    Tablet 40 milliGRAM(s) Oral daily  apixaban 5 milliGRAM(s) Oral every 12 hours  amiodarone    Tablet 400 milliGRAM(s) Oral three times a day      TELEMETRY:nsr 	    ECG:  	  RADIOLOGY:  OTHER: 	  	  LABS:	 	    CARDIAC MARKERS:                                15.3   5.61  )-----------( 162      ( 17 Aug 2017 08:38 )             46.8     08-17    140  |  104  |  14  ----------------------------<  94  4.6   |  21<L>  |  1.19    Ca    8.9      17 Aug 2017 08:38  Mg     2.2     08-17      proBNP: Serum Pro-Brain Natriuretic Peptide: 667 pg/mL (08-14 @ 14:16)    Lipid Profile: Cholesterol 103  LDL 48  HDL 36  TG 97    HgA1c: Hemoglobin A1C, Whole Blood: 5.7 % (08-15 @ 08:39)    TSH: Thyroid Stimulating Hormone, Serum: 2.56 uIU/mL (08-15 @ 08:41)  Thyroid Stimulating Hormone, Serum: 2.04 uIU/mL (08-15 @ 01:37)

## 2017-08-19 VITALS — HEART RATE: 72 BPM

## 2017-08-19 LAB
ANION GAP SERPL CALC-SCNC: 14 MMOL/L — SIGNIFICANT CHANGE UP (ref 5–17)
BUN SERPL-MCNC: 16 MG/DL — SIGNIFICANT CHANGE UP (ref 7–23)
CALCIUM SERPL-MCNC: 9.4 MG/DL — SIGNIFICANT CHANGE UP (ref 8.4–10.5)
CHLORIDE SERPL-SCNC: 108 MMOL/L — SIGNIFICANT CHANGE UP (ref 96–108)
CO2 SERPL-SCNC: 19 MMOL/L — LOW (ref 22–31)
CREAT SERPL-MCNC: 1.27 MG/DL — SIGNIFICANT CHANGE UP (ref 0.5–1.3)
GLUCOSE SERPL-MCNC: 103 MG/DL — HIGH (ref 70–99)
POTASSIUM SERPL-MCNC: 4.6 MMOL/L — SIGNIFICANT CHANGE UP (ref 3.5–5.3)
POTASSIUM SERPL-SCNC: 4.6 MMOL/L — SIGNIFICANT CHANGE UP (ref 3.5–5.3)
SODIUM SERPL-SCNC: 141 MMOL/L — SIGNIFICANT CHANGE UP (ref 135–145)

## 2017-08-19 PROCEDURE — 84480 ASSAY TRIIODOTHYRONINE (T3): CPT

## 2017-08-19 PROCEDURE — 80061 LIPID PANEL: CPT

## 2017-08-19 PROCEDURE — 83036 HEMOGLOBIN GLYCOSYLATED A1C: CPT

## 2017-08-19 PROCEDURE — 71046 X-RAY EXAM CHEST 2 VIEWS: CPT

## 2017-08-19 PROCEDURE — 82550 ASSAY OF CK (CPK): CPT

## 2017-08-19 PROCEDURE — 85027 COMPLETE CBC AUTOMATED: CPT

## 2017-08-19 PROCEDURE — 93312 ECHO TRANSESOPHAGEAL: CPT

## 2017-08-19 PROCEDURE — 93005 ELECTROCARDIOGRAM TRACING: CPT

## 2017-08-19 PROCEDURE — 82553 CREATINE MB FRACTION: CPT

## 2017-08-19 PROCEDURE — 80162 ASSAY OF DIGOXIN TOTAL: CPT

## 2017-08-19 PROCEDURE — 84436 ASSAY OF TOTAL THYROXINE: CPT

## 2017-08-19 PROCEDURE — 85730 THROMBOPLASTIN TIME PARTIAL: CPT

## 2017-08-19 PROCEDURE — C1894: CPT

## 2017-08-19 PROCEDURE — 93460 R&L HRT ART/VENTRICLE ANGIO: CPT

## 2017-08-19 PROCEDURE — 99285 EMERGENCY DEPT VISIT HI MDM: CPT | Mod: 25

## 2017-08-19 PROCEDURE — 85610 PROTHROMBIN TIME: CPT

## 2017-08-19 PROCEDURE — 84100 ASSAY OF PHOSPHORUS: CPT

## 2017-08-19 PROCEDURE — 83735 ASSAY OF MAGNESIUM: CPT

## 2017-08-19 PROCEDURE — 83880 ASSAY OF NATRIURETIC PEPTIDE: CPT

## 2017-08-19 PROCEDURE — 80048 BASIC METABOLIC PNL TOTAL CA: CPT

## 2017-08-19 PROCEDURE — C1769: CPT

## 2017-08-19 PROCEDURE — 93308 TTE F-UP OR LMTD: CPT

## 2017-08-19 PROCEDURE — 84484 ASSAY OF TROPONIN QUANT: CPT

## 2017-08-19 PROCEDURE — C1887: CPT

## 2017-08-19 PROCEDURE — 92960 CARDIOVERSION ELECTRIC EXT: CPT

## 2017-08-19 PROCEDURE — 93306 TTE W/DOPPLER COMPLETE: CPT

## 2017-08-19 PROCEDURE — 80053 COMPREHEN METABOLIC PANEL: CPT

## 2017-08-19 PROCEDURE — 84443 ASSAY THYROID STIM HORMONE: CPT

## 2017-08-19 RX ORDER — APIXABAN 2.5 MG/1
1 TABLET, FILM COATED ORAL
Qty: 60 | Refills: 0
Start: 2017-08-19 | End: 2017-09-18

## 2017-08-19 RX ORDER — AMIODARONE HYDROCHLORIDE 400 MG/1
1 TABLET ORAL
Qty: 30 | Refills: 0
Start: 2017-08-19 | End: 2017-09-18

## 2017-08-19 RX ORDER — AMIODARONE HYDROCHLORIDE 400 MG/1
200 TABLET ORAL
Qty: 30 | Refills: 0 | OUTPATIENT
Start: 2017-08-19

## 2017-08-19 RX ADMIN — AMIODARONE HYDROCHLORIDE 400 MILLIGRAM(S): 400 TABLET ORAL at 05:42

## 2017-08-19 RX ADMIN — APIXABAN 5 MILLIGRAM(S): 2.5 TABLET, FILM COATED ORAL at 05:42

## 2017-08-19 RX ADMIN — Medication 100 MILLIGRAM(S): at 05:43

## 2017-08-19 RX ADMIN — Medication 81 MILLIGRAM(S): at 11:52

## 2017-08-19 RX ADMIN — Medication 40 MILLIGRAM(S): at 05:42

## 2017-08-19 RX ADMIN — Medication 0.12 MILLIGRAM(S): at 05:42

## 2017-08-19 RX ADMIN — SPIRONOLACTONE 25 MILLIGRAM(S): 25 TABLET, FILM COATED ORAL at 05:42

## 2017-08-19 RX ADMIN — SACUBITRIL AND VALSARTAN 1 TABLET(S): 24; 26 TABLET, FILM COATED ORAL at 05:42

## 2017-08-19 NOTE — PROGRESS NOTE ADULT - SUBJECTIVE AND OBJECTIVE BOX
- Patient seen and examined.  - In summary, patient is a 56y year old man who presented with SOB, AF. (14 Aug 2017 18:29)  - Today, patient is without complaints.         *****MEDICATIONS:    MEDICATIONS  (STANDING):  spironolactone 25 milliGRAM(s) Oral daily  aspirin  chewable 81 milliGRAM(s) Oral daily  sacubitril 97 mG/valsartan 103 mG 1 Tablet(s) Oral two times a day  digoxin     Tablet 0.125 milliGRAM(s) Oral daily  atorvastatin 20 milliGRAM(s) Oral at bedtime  metoprolol succinate  milliGRAM(s) Oral daily  furosemide    Tablet 40 milliGRAM(s) Oral daily  apixaban 5 milliGRAM(s) Oral every 12 hours  amiodarone    Tablet 400 milliGRAM(s) Oral three times a day    MEDICATIONS  (PRN):               ***** REVIEW OF SYSTEM:  GEN: no fever, no chills, no pain  RESP: no SOB, no cough, no sputum  CVS: no chest pain, no palpitations, no edema  GI: no abdominal pain, no nausea, no vomiting, no constipation, no diarrhea  : no dysurea, no frequency  NEURO: no headache, no diziness  PSYCH: no depression, not anxious  Derm : no itching, no rash         ***** VITAL SIGNS:    T(F): 97.3 (08-19-17 @ 04:23), Max: 98.2 (08-18-17 @ 11:57)  HR: 72 (08-19-17 @ 05:41) (52 - 72)  BP: 117/73 (08-19-17 @ 04:23) (117/73 - 124/79)  RR: 18 (08-19-17 @ 04:23) (18 - 18)  SpO2: 99% (08-19-17 @ 04:23) (96% - 99%)  Wt(kg): --  ,   I&O's Summary    18 Aug 2017 07:01  -  19 Aug 2017 07:00  --------------------------------------------------------  IN: 880 mL / OUT: 600 mL / NET: 280 mL                 *****PHYSICAL EXAM:  GEN: A&O X 3 , NAD , comfortable  HEENT: NCAT, EOMI, MMM, no icterus  NECK: Supple, No JVD  CVS: S1S2 , regular , No M/R/G appreciated  PULM: CTA B/L,  no W/R/R appreciated  ABD.: soft. non tender, non distended,  bowel sounds present  Extrem: intact pulses , no edema noted  Derm: No rash or ecchymosis noted  PSYCH: normal mood, no depression, not anxious         *****LAB AND IMAGING:                                     15.8   6.2   )-----------( 158      ( 18 Aug 2017 10:45 )             48.1               08-19    141  |  108  |  16  ----------------------------<  103<H>  4.6   |  19<L>  |  1.27    Ca    9.4      19 Aug 2017 07:24  Phos  2.8     08-18  Mg     2.1     08-18        [All pertinent recent Imaging/Reports reviewed]         *****A S S E S S M E N T   A N D   P L A N :    56M with acute on chronic systolic CHF, PAF  keep O=I  s/p cath, NICM  no clot on LORE  s/p DCCV  tele NSR w PVC  AC on eliquis  amio load   change amio to 200mg daily  DCP    __________________________  LIDA Judge D.O.

## 2018-09-27 ENCOUNTER — EMERGENCY (EMERGENCY)
Facility: HOSPITAL | Age: 58
LOS: 1 days | Discharge: HOME | End: 2018-09-27
Attending: EMERGENCY MEDICINE
Payer: COMMERCIAL

## 2018-09-27 VITALS
SYSTOLIC BLOOD PRESSURE: 185 MMHG | DIASTOLIC BLOOD PRESSURE: 71 MMHG | WEIGHT: 279.99 LBS | RESPIRATION RATE: 18 BRPM | HEART RATE: 108 BPM | TEMPERATURE: 100 F | OXYGEN SATURATION: 97 % | HEIGHT: 71 IN

## 2018-09-27 VITALS
OXYGEN SATURATION: 99 % | SYSTOLIC BLOOD PRESSURE: 132 MMHG | HEART RATE: 92 BPM | RESPIRATION RATE: 18 BRPM | TEMPERATURE: 100 F | DIASTOLIC BLOOD PRESSURE: 90 MMHG

## 2018-09-27 DIAGNOSIS — Z95.810 PRESENCE OF AUTOMATIC (IMPLANTABLE) CARDIAC DEFIBRILLATOR: Chronic | ICD-10-CM

## 2018-09-27 PROBLEM — I50.9 HEART FAILURE, UNSPECIFIED: Chronic | Status: ACTIVE | Noted: 2017-08-14

## 2018-09-27 PROBLEM — E78.5 HYPERLIPIDEMIA, UNSPECIFIED: Chronic | Status: ACTIVE | Noted: 2017-08-14

## 2018-09-27 LAB
ALBUMIN SERPL ELPH-MCNC: 4 G/DL — SIGNIFICANT CHANGE UP (ref 3.5–5)
ALP SERPL-CCNC: 52 U/L — SIGNIFICANT CHANGE UP (ref 40–120)
ALT FLD-CCNC: 27 U/L DA — SIGNIFICANT CHANGE UP (ref 10–60)
ANION GAP SERPL CALC-SCNC: 8 MMOL/L — SIGNIFICANT CHANGE UP (ref 5–17)
APPEARANCE UR: CLEAR — SIGNIFICANT CHANGE UP
APTT BLD: 34.6 SEC — SIGNIFICANT CHANGE UP (ref 27.5–37.4)
AST SERPL-CCNC: 14 U/L — SIGNIFICANT CHANGE UP (ref 10–40)
BASOPHILS # BLD AUTO: 0.1 K/UL — SIGNIFICANT CHANGE UP (ref 0–0.2)
BASOPHILS NFR BLD AUTO: 0.7 % — SIGNIFICANT CHANGE UP (ref 0–2)
BILIRUB SERPL-MCNC: 0.7 MG/DL — SIGNIFICANT CHANGE UP (ref 0.2–1.2)
BILIRUB UR-MCNC: NEGATIVE — SIGNIFICANT CHANGE UP
BUN SERPL-MCNC: 17 MG/DL — SIGNIFICANT CHANGE UP (ref 7–18)
CALCIUM SERPL-MCNC: 9 MG/DL — SIGNIFICANT CHANGE UP (ref 8.4–10.5)
CHLORIDE SERPL-SCNC: 106 MMOL/L — SIGNIFICANT CHANGE UP (ref 96–108)
CO2 SERPL-SCNC: 24 MMOL/L — SIGNIFICANT CHANGE UP (ref 22–31)
COLOR SPEC: YELLOW — SIGNIFICANT CHANGE UP
CREAT SERPL-MCNC: 1.54 MG/DL — HIGH (ref 0.5–1.3)
DIFF PNL FLD: ABNORMAL
EOSINOPHIL # BLD AUTO: 0.2 K/UL — SIGNIFICANT CHANGE UP (ref 0–0.5)
EOSINOPHIL NFR BLD AUTO: 1.4 % — SIGNIFICANT CHANGE UP (ref 0–6)
EPI CELLS # UR: ABNORMAL /HPF
GLUCOSE SERPL-MCNC: 105 MG/DL — HIGH (ref 70–99)
GLUCOSE UR QL: NEGATIVE — SIGNIFICANT CHANGE UP
HCT VFR BLD CALC: 47.5 % — SIGNIFICANT CHANGE UP (ref 39–50)
HGB BLD-MCNC: 15 G/DL — SIGNIFICANT CHANGE UP (ref 13–17)
INR BLD: 1.3 RATIO — HIGH (ref 0.88–1.16)
KETONES UR-MCNC: NEGATIVE — SIGNIFICANT CHANGE UP
LEUKOCYTE ESTERASE UR-ACNC: NEGATIVE — SIGNIFICANT CHANGE UP
LYMPHOCYTES # BLD AUTO: 0.9 K/UL — LOW (ref 1–3.3)
LYMPHOCYTES # BLD AUTO: 7.3 % — LOW (ref 13–44)
MCHC RBC-ENTMCNC: 31.3 PG — SIGNIFICANT CHANGE UP (ref 27–34)
MCHC RBC-ENTMCNC: 31.5 GM/DL — LOW (ref 32–36)
MCV RBC AUTO: 99.2 FL — SIGNIFICANT CHANGE UP (ref 80–100)
MONOCYTES # BLD AUTO: 0.6 K/UL — SIGNIFICANT CHANGE UP (ref 0–0.9)
MONOCYTES NFR BLD AUTO: 4.6 % — SIGNIFICANT CHANGE UP (ref 2–14)
NEUTROPHILS # BLD AUTO: 11.1 K/UL — HIGH (ref 1.8–7.4)
NEUTROPHILS NFR BLD AUTO: 86 % — HIGH (ref 43–77)
NITRITE UR-MCNC: NEGATIVE — SIGNIFICANT CHANGE UP
PH UR: 5 — SIGNIFICANT CHANGE UP (ref 5–8)
PLATELET # BLD AUTO: 164 K/UL — SIGNIFICANT CHANGE UP (ref 150–400)
POTASSIUM SERPL-MCNC: 4.5 MMOL/L — SIGNIFICANT CHANGE UP (ref 3.5–5.3)
POTASSIUM SERPL-SCNC: 4.5 MMOL/L — SIGNIFICANT CHANGE UP (ref 3.5–5.3)
PROT SERPL-MCNC: 7.6 G/DL — SIGNIFICANT CHANGE UP (ref 6–8.3)
PROT UR-MCNC: 30 MG/DL
PROTHROM AB SERPL-ACNC: 14.2 SEC — HIGH (ref 9.8–12.7)
RAPID RVP RESULT: SIGNIFICANT CHANGE UP
RBC # BLD: 4.79 M/UL — SIGNIFICANT CHANGE UP (ref 4.2–5.8)
RBC # FLD: 13.7 % — SIGNIFICANT CHANGE UP (ref 10.3–14.5)
RBC CASTS # UR COMP ASSIST: SIGNIFICANT CHANGE UP /HPF (ref 0–2)
SODIUM SERPL-SCNC: 138 MMOL/L — SIGNIFICANT CHANGE UP (ref 135–145)
SP GR SPEC: 1.01 — SIGNIFICANT CHANGE UP (ref 1.01–1.02)
UROBILINOGEN FLD QL: NEGATIVE — SIGNIFICANT CHANGE UP
WBC # BLD: 12.9 K/UL — HIGH (ref 3.8–10.5)
WBC # FLD AUTO: 12.9 K/UL — HIGH (ref 3.8–10.5)

## 2018-09-27 PROCEDURE — 86901 BLOOD TYPING SEROLOGIC RH(D): CPT

## 2018-09-27 PROCEDURE — 87633 RESP VIRUS 12-25 TARGETS: CPT

## 2018-09-27 PROCEDURE — 71046 X-RAY EXAM CHEST 2 VIEWS: CPT | Mod: 26

## 2018-09-27 PROCEDURE — 87798 DETECT AGENT NOS DNA AMP: CPT

## 2018-09-27 PROCEDURE — 99283 EMERGENCY DEPT VISIT LOW MDM: CPT

## 2018-09-27 PROCEDURE — 85730 THROMBOPLASTIN TIME PARTIAL: CPT

## 2018-09-27 PROCEDURE — 86850 RBC ANTIBODY SCREEN: CPT

## 2018-09-27 PROCEDURE — 87040 BLOOD CULTURE FOR BACTERIA: CPT

## 2018-09-27 PROCEDURE — 85610 PROTHROMBIN TIME: CPT

## 2018-09-27 PROCEDURE — 87581 M.PNEUMON DNA AMP PROBE: CPT

## 2018-09-27 PROCEDURE — 81001 URINALYSIS AUTO W/SCOPE: CPT

## 2018-09-27 PROCEDURE — 71046 X-RAY EXAM CHEST 2 VIEWS: CPT

## 2018-09-27 PROCEDURE — 87150 DNA/RNA AMPLIFIED PROBE: CPT

## 2018-09-27 PROCEDURE — 86900 BLOOD TYPING SEROLOGIC ABO: CPT

## 2018-09-27 PROCEDURE — 96366 THER/PROPH/DIAG IV INF ADDON: CPT

## 2018-09-27 PROCEDURE — 87486 CHLMYD PNEUM DNA AMP PROBE: CPT

## 2018-09-27 PROCEDURE — 96365 THER/PROPH/DIAG IV INF INIT: CPT

## 2018-09-27 PROCEDURE — 99284 EMERGENCY DEPT VISIT MOD MDM: CPT | Mod: 25

## 2018-09-27 PROCEDURE — 87184 SC STD DISK METHOD PER PLATE: CPT

## 2018-09-27 PROCEDURE — 87086 URINE CULTURE/COLONY COUNT: CPT

## 2018-09-27 PROCEDURE — 93005 ELECTROCARDIOGRAM TRACING: CPT

## 2018-09-27 PROCEDURE — 80053 COMPREHEN METABOLIC PANEL: CPT

## 2018-09-27 PROCEDURE — 85027 COMPLETE CBC AUTOMATED: CPT

## 2018-09-27 RX ORDER — ACETAMINOPHEN 500 MG
975 TABLET ORAL ONCE
Qty: 0 | Refills: 0 | Status: COMPLETED | OUTPATIENT
Start: 2018-09-27 | End: 2018-09-27

## 2018-09-27 RX ORDER — CEFEPIME 1 G/1
2000 INJECTION, POWDER, FOR SOLUTION INTRAMUSCULAR; INTRAVENOUS ONCE
Qty: 0 | Refills: 0 | Status: COMPLETED | OUTPATIENT
Start: 2018-09-27 | End: 2018-09-27

## 2018-09-27 RX ORDER — MORPHINE SULFATE 50 MG/1
4 CAPSULE, EXTENDED RELEASE ORAL ONCE
Qty: 0 | Refills: 0 | Status: DISCONTINUED | OUTPATIENT
Start: 2018-09-27 | End: 2018-09-27

## 2018-09-27 RX ORDER — SODIUM CHLORIDE 9 MG/ML
500 INJECTION INTRAMUSCULAR; INTRAVENOUS; SUBCUTANEOUS ONCE
Qty: 0 | Refills: 0 | Status: COMPLETED | OUTPATIENT
Start: 2018-09-27 | End: 2018-09-27

## 2018-09-27 RX ADMIN — SODIUM CHLORIDE 500 MILLILITER(S): 9 INJECTION INTRAMUSCULAR; INTRAVENOUS; SUBCUTANEOUS at 08:12

## 2018-09-27 RX ADMIN — Medication 975 MILLIGRAM(S): at 08:12

## 2018-09-27 RX ADMIN — CEFEPIME 100 MILLIGRAM(S): 1 INJECTION, POWDER, FOR SOLUTION INTRAMUSCULAR; INTRAVENOUS at 09:03

## 2018-09-27 NOTE — ED PROVIDER NOTE - MEDICAL DECISION MAKING DETAILS
58 yo M with cardiac hx presents with chills, found to be febrile. Will do sepsis work out, EKG, observe and reassess.

## 2018-09-27 NOTE — ED PROVIDER NOTE - NS ED ROS FT
CONSTITUTIONAL: no fever, +chills   EYES: no visual changes, no eye pain   ENMT: no nasal congestion, no throat pain  CARDIOVASCULAR: no chest pain, no edema, no palpitations   RESPIRATORY: no shortness of breath, no cough   GASTROINTESTINAL: no abdominal pain, no nausea, no vomiting, no diarrhea, no constipation   GENITOURINARY: no dysuria, no frequency  MUSCULOSKELETAL: no joint pains, no myalgias, no back pain   SKIN: no rashes  NEUROLOGICAL: no weakness, no headache, no dizziness, no slurred speech, no syncope   PSYCHIATRIC: no known mental health illness   HEME/LYMPH: no lymphadenopathy      All other ROS negative except as per HPI

## 2018-09-27 NOTE — ED PROVIDER NOTE - OBJECTIVE STATEMENT
58 yo M pmh of afib (on AC), pacemaker, CHF, HTN, and HLD presents with rigors since 5 AM. Denies other acute complaints.

## 2018-09-27 NOTE — ED PROVIDER NOTE - PROGRESS NOTE DETAILS
EKG - afib, lateral ST depressions  Sepsis work up initiated. Will sign out to Dr Castro to f/u labs, UA, CXR, reassess, and final dispo. EKG - afib, lateral ST depressions (similar to previous)   Sepsis work up initiated. Will sign out to Dr Castro to f/u labs, UA, CXR, reassess, and final dispo. feels better, will DC to home.  Copies of results given to patient.

## 2018-09-27 NOTE — ED PROVIDER NOTE - PHYSICAL EXAMINATION
GENERAL: wells appearing, no acute distress   HEAD: atraumatic   EYES: EOMI, pink conjunctiva   ENT: moist oral mucosa   CARDIAC: tachycardic, no edema, distal pulses present, pacemaker L chest wall   RESPIRATORY: lungs CTAB, no increased work of breathing   GASTROINTESTINAL: no abdominal tenderness, no rebound or guarding, bowel sounds presents  GENITOURINARY: no CVA tenderness   MUSCULOSKELETAL: no deformity   NEUROLOGICAL: AAOx3, spontaneous movement of extremities   SKIN: intact   PSYCHIATRIC: cooperative  HEME LYMPH: no lymphadenopathy

## 2018-09-28 LAB
CULTURE RESULTS: NO GROWTH — SIGNIFICANT CHANGE UP
GP B STREP DNA BLD POS QL NAA+NON-PROBE: SIGNIFICANT CHANGE UP
GRAM STN FLD: SIGNIFICANT CHANGE UP
METHOD TYPE: SIGNIFICANT CHANGE UP
SPECIMEN SOURCE: SIGNIFICANT CHANGE UP
SPECIMEN SOURCE: SIGNIFICANT CHANGE UP

## 2018-09-29 LAB
-  CEFTRIAXONE: SIGNIFICANT CHANGE UP
-  CLINDAMYCIN: SIGNIFICANT CHANGE UP
-  LEVOFLOXACIN: SIGNIFICANT CHANGE UP
-  VANCOMYCIN: SIGNIFICANT CHANGE UP
CULTURE RESULTS: SIGNIFICANT CHANGE UP
METHOD TYPE: SIGNIFICANT CHANGE UP
METHOD TYPE: SIGNIFICANT CHANGE UP
SPECIMEN SOURCE: SIGNIFICANT CHANGE UP

## 2018-09-30 LAB
-  PENICILLIN: SIGNIFICANT CHANGE UP
CULTURE RESULTS: SIGNIFICANT CHANGE UP
ORGANISM # SPEC MICROSCOPIC CNT: SIGNIFICANT CHANGE UP
SPECIMEN SOURCE: SIGNIFICANT CHANGE UP

## 2018-10-01 ENCOUNTER — INPATIENT (INPATIENT)
Facility: HOSPITAL | Age: 58
LOS: 3 days | Discharge: HOME | DRG: 603 | End: 2018-10-05
Attending: INTERNAL MEDICINE | Admitting: INTERNAL MEDICINE
Payer: COMMERCIAL

## 2018-10-01 VITALS
HEART RATE: 86 BPM | TEMPERATURE: 98 F | SYSTOLIC BLOOD PRESSURE: 152 MMHG | RESPIRATION RATE: 17 BRPM | DIASTOLIC BLOOD PRESSURE: 92 MMHG | OXYGEN SATURATION: 96 %

## 2018-10-01 DIAGNOSIS — Z95.810 PRESENCE OF AUTOMATIC (IMPLANTABLE) CARDIAC DEFIBRILLATOR: Chronic | ICD-10-CM

## 2018-10-01 DIAGNOSIS — I10 ESSENTIAL (PRIMARY) HYPERTENSION: ICD-10-CM

## 2018-10-01 DIAGNOSIS — Z95.810 PRESENCE OF AUTOMATIC (IMPLANTABLE) CARDIAC DEFIBRILLATOR: ICD-10-CM

## 2018-10-01 DIAGNOSIS — Z29.9 ENCOUNTER FOR PROPHYLACTIC MEASURES, UNSPECIFIED: ICD-10-CM

## 2018-10-01 DIAGNOSIS — I48.91 UNSPECIFIED ATRIAL FIBRILLATION: ICD-10-CM

## 2018-10-01 DIAGNOSIS — I50.9 HEART FAILURE, UNSPECIFIED: ICD-10-CM

## 2018-10-01 DIAGNOSIS — N17.9 ACUTE KIDNEY FAILURE, UNSPECIFIED: ICD-10-CM

## 2018-10-01 DIAGNOSIS — R78.81 BACTEREMIA: ICD-10-CM

## 2018-10-01 LAB
ALBUMIN SERPL ELPH-MCNC: 3.9 G/DL — SIGNIFICANT CHANGE UP (ref 3.5–5)
ALP SERPL-CCNC: 49 U/L — SIGNIFICANT CHANGE UP (ref 40–120)
ALT FLD-CCNC: 32 U/L DA — SIGNIFICANT CHANGE UP (ref 10–60)
ANION GAP SERPL CALC-SCNC: 7 MMOL/L — SIGNIFICANT CHANGE UP (ref 5–17)
AST SERPL-CCNC: 15 U/L — SIGNIFICANT CHANGE UP (ref 10–40)
BASOPHILS # BLD AUTO: 0.2 K/UL — SIGNIFICANT CHANGE UP (ref 0–0.2)
BASOPHILS NFR BLD AUTO: 2.8 % — HIGH (ref 0–2)
BILIRUB SERPL-MCNC: 0.7 MG/DL — SIGNIFICANT CHANGE UP (ref 0.2–1.2)
BUN SERPL-MCNC: 15 MG/DL — SIGNIFICANT CHANGE UP (ref 7–18)
CALCIUM SERPL-MCNC: 8.8 MG/DL — SIGNIFICANT CHANGE UP (ref 8.4–10.5)
CHLORIDE SERPL-SCNC: 109 MMOL/L — HIGH (ref 96–108)
CO2 SERPL-SCNC: 25 MMOL/L — SIGNIFICANT CHANGE UP (ref 22–31)
CREAT SERPL-MCNC: 1.41 MG/DL — HIGH (ref 0.5–1.3)
EOSINOPHIL # BLD AUTO: 0.4 K/UL — SIGNIFICANT CHANGE UP (ref 0–0.5)
EOSINOPHIL NFR BLD AUTO: 5.4 % — SIGNIFICANT CHANGE UP (ref 0–6)
GLUCOSE SERPL-MCNC: 84 MG/DL — SIGNIFICANT CHANGE UP (ref 70–99)
HCT VFR BLD CALC: 46.1 % — SIGNIFICANT CHANGE UP (ref 39–50)
HGB BLD-MCNC: 14.6 G/DL — SIGNIFICANT CHANGE UP (ref 13–17)
LACTATE SERPL-SCNC: 1.6 MMOL/L — SIGNIFICANT CHANGE UP (ref 0.7–2)
LYMPHOCYTES # BLD AUTO: 1.7 K/UL — SIGNIFICANT CHANGE UP (ref 1–3.3)
LYMPHOCYTES # BLD AUTO: 23.6 % — SIGNIFICANT CHANGE UP (ref 13–44)
MCHC RBC-ENTMCNC: 31 PG — SIGNIFICANT CHANGE UP (ref 27–34)
MCHC RBC-ENTMCNC: 31.8 GM/DL — LOW (ref 32–36)
MCV RBC AUTO: 97.8 FL — SIGNIFICANT CHANGE UP (ref 80–100)
MONOCYTES # BLD AUTO: 0.5 K/UL — SIGNIFICANT CHANGE UP (ref 0–0.9)
MONOCYTES NFR BLD AUTO: 7.2 % — SIGNIFICANT CHANGE UP (ref 2–14)
NEUTROPHILS # BLD AUTO: 4.4 K/UL — SIGNIFICANT CHANGE UP (ref 1.8–7.4)
NEUTROPHILS NFR BLD AUTO: 60.9 % — SIGNIFICANT CHANGE UP (ref 43–77)
PLATELET # BLD AUTO: 191 K/UL — SIGNIFICANT CHANGE UP (ref 150–400)
POTASSIUM SERPL-MCNC: 4.2 MMOL/L — SIGNIFICANT CHANGE UP (ref 3.5–5.3)
POTASSIUM SERPL-SCNC: 4.2 MMOL/L — SIGNIFICANT CHANGE UP (ref 3.5–5.3)
PROT SERPL-MCNC: 7.8 G/DL — SIGNIFICANT CHANGE UP (ref 6–8.3)
RBC # BLD: 4.71 M/UL — SIGNIFICANT CHANGE UP (ref 4.2–5.8)
RBC # FLD: 13.6 % — SIGNIFICANT CHANGE UP (ref 10.3–14.5)
SODIUM SERPL-SCNC: 141 MMOL/L — SIGNIFICANT CHANGE UP (ref 135–145)
WBC # BLD: 7.3 K/UL — SIGNIFICANT CHANGE UP (ref 3.8–10.5)
WBC # FLD AUTO: 7.3 K/UL — SIGNIFICANT CHANGE UP (ref 3.8–10.5)

## 2018-10-01 PROCEDURE — 99285 EMERGENCY DEPT VISIT HI MDM: CPT

## 2018-10-01 RX ORDER — ATORVASTATIN CALCIUM 80 MG/1
20 TABLET, FILM COATED ORAL AT BEDTIME
Qty: 0 | Refills: 0 | Status: DISCONTINUED | OUTPATIENT
Start: 2018-10-01 | End: 2018-10-05

## 2018-10-01 RX ORDER — FUROSEMIDE 40 MG
20 TABLET ORAL DAILY
Qty: 0 | Refills: 0 | Status: DISCONTINUED | OUTPATIENT
Start: 2018-10-01 | End: 2018-10-05

## 2018-10-01 RX ORDER — ASPIRIN/CALCIUM CARB/MAGNESIUM 324 MG
81 TABLET ORAL DAILY
Qty: 0 | Refills: 0 | Status: DISCONTINUED | OUTPATIENT
Start: 2018-10-01 | End: 2018-10-05

## 2018-10-01 RX ORDER — DIGOXIN 250 MCG
0.12 TABLET ORAL DAILY
Qty: 0 | Refills: 0 | Status: DISCONTINUED | OUTPATIENT
Start: 2018-10-01 | End: 2018-10-02

## 2018-10-01 RX ORDER — METOPROLOL TARTRATE 50 MG
100 TABLET ORAL DAILY
Qty: 0 | Refills: 0 | Status: DISCONTINUED | OUTPATIENT
Start: 2018-10-01 | End: 2018-10-05

## 2018-10-01 RX ORDER — AMPICILLIN SODIUM AND SULBACTAM SODIUM 250; 125 MG/ML; MG/ML
3 INJECTION, POWDER, FOR SUSPENSION INTRAMUSCULAR; INTRAVENOUS ONCE
Qty: 0 | Refills: 0 | Status: COMPLETED | OUTPATIENT
Start: 2018-10-01 | End: 2018-10-01

## 2018-10-01 RX ORDER — SPIRONOLACTONE 25 MG/1
25 TABLET, FILM COATED ORAL DAILY
Qty: 0 | Refills: 0 | Status: DISCONTINUED | OUTPATIENT
Start: 2018-10-01 | End: 2018-10-05

## 2018-10-01 RX ORDER — SACUBITRIL AND VALSARTAN 24; 26 MG/1; MG/1
1 TABLET, FILM COATED ORAL
Qty: 0 | Refills: 0 | Status: DISCONTINUED | OUTPATIENT
Start: 2018-10-01 | End: 2018-10-05

## 2018-10-01 RX ORDER — APIXABAN 2.5 MG/1
5 TABLET, FILM COATED ORAL EVERY 12 HOURS
Qty: 0 | Refills: 0 | Status: DISCONTINUED | OUTPATIENT
Start: 2018-10-01 | End: 2018-10-05

## 2018-10-01 RX ORDER — AMPICILLIN SODIUM AND SULBACTAM SODIUM 250; 125 MG/ML; MG/ML
3 INJECTION, POWDER, FOR SUSPENSION INTRAMUSCULAR; INTRAVENOUS EVERY 6 HOURS
Qty: 0 | Refills: 0 | Status: DISCONTINUED | OUTPATIENT
Start: 2018-10-01 | End: 2018-10-05

## 2018-10-01 RX ORDER — AMIODARONE HYDROCHLORIDE 400 MG/1
200 TABLET ORAL DAILY
Qty: 0 | Refills: 0 | Status: DISCONTINUED | OUTPATIENT
Start: 2018-10-01 | End: 2018-10-05

## 2018-10-01 RX ADMIN — AMPICILLIN SODIUM AND SULBACTAM SODIUM 200 GRAM(S): 250; 125 INJECTION, POWDER, FOR SUSPENSION INTRAMUSCULAR; INTRAVENOUS at 18:21

## 2018-10-01 RX ADMIN — ATORVASTATIN CALCIUM 20 MILLIGRAM(S): 80 TABLET, FILM COATED ORAL at 21:53

## 2018-10-01 NOTE — H&P ADULT - ASSESSMENT
56 yo M with PMH of CHF, HTN, AICD( since March 2015) was called to the ED because his blood cultures taken on his visit to ED on Thursday Oct 27, 2018 came back positive for group b Streptococcus agalactiae.

## 2018-10-01 NOTE — ED PROVIDER NOTE - DR. NAME
Detail Level: Detailed
Quality 110: Preventive Care And Screening: Influenza Immunization: Influenza Immunization previously received during influenza season
Dino Castro)

## 2018-10-01 NOTE — ED POST DISCHARGE NOTE - ADDITIONAL DOCUMENTATION
pts wife states she will have pt come to the ED as soon as possible  discussed need for iv anitbioitics and repeat blood culture  states pt was feeling better today but unsure if fever today

## 2018-10-01 NOTE — H&P ADULT - PROBLEM SELECTOR PLAN 2
last ECHO in Aug 2017: severe LV systolic dysfunction  AICD placement in March 2015  Home meds : Entresto, Metoprolol, Amiodarone, Digoxin Spironolactone: continued   Lasix 40 PRN only when his legs feel to tight,   -presently not in exacerbation but peripheral edema is present   - Lasix PO 20 daily

## 2018-10-01 NOTE — H&P ADULT - PROBLEM SELECTOR PLAN 7
IMPROVE VTE Individual Risk Assessment  RISK                                                                Points  [  ] Previous VTE                                                  3  [  ] Thrombophilia                                               2  [  ] Lower limb paralysis                                      2        (unable to hold up >15 seconds)    [  ] Current Cancer                                              2       (within 6 months)    [ x ] Immobilization > 24 hrs                                1  [  ] ICU/CCU stay > 24 hours                              1  [  ] Age > 60                                                      1    IMPROVE VTE Score _________1  Patient is on Eliquis 5mg BID for Afib

## 2018-10-01 NOTE — ED ADULT NURSE NOTE - OBJECTIVE STATEMENT
Pt AOx3, ambulatory, called back for abnormal blood results. Pt was seen on 9/27/18, and sent home with antibiotics. Pt denies pain, n/v, no distress noted. Pt has AICD in place

## 2018-10-01 NOTE — H&P ADULT - HISTORY OF PRESENT ILLNESS
56 yo M with PMH of CHF, HTN, AICD( since March 2015) was called to the ED because his blood cultures taken on his visit to ED on Thursday Oct 27, 2018 came back positive for group b Streptococcus agalactiae 56 yo M with PMH of CHF, HTN, AICD( since March 2015) was called to the ED because his blood cultures taken on his visit to ED on Thursday Oct 27, 2018 came back positive for group b Streptococcus agalactiae. On last Thursday, early morning patient was sitting and watching TV, when he started feeling cold and his hands and legs starting shaking with chills. He was able to go to work but felt worse at work and brought to the ED. In ED blood culture were sent, broad spectrum dose was given and he was discharged from the ED. When he went back home he felt better and the next day he felt completely fine.  His Blood culture came back positive for group B Strep and he was called back to the ED. Today he feels fine, no fever, no chills, no burning micturition no skin lesion. No sick contacts   PMH: CHF(ECHO: Aug 17 : severe left systolic function) with AICD placement in March 2015, Afib diagnosed in Aug 2017, HTN.   Pt recently in Aug  went to see his cardiologist, his AICD was interrogated, EP study was done  and he was recommended to get ablation done for Afib at [facility].   FH: sudden cardiac death in multiple family members(sister at the age 42, uncles, grandfather on both sides, according to the patient)   SH: social drinking, 1-2 times a day, chronic smoker, half a pack a day

## 2018-10-01 NOTE — H&P ADULT - NEGATIVE NEUROLOGICAL SYMPTOMS
no focal seizures/no transient paralysis/no weakness/no generalized seizures/no paresthesias/no tremors/no syncope

## 2018-10-01 NOTE — ED PROVIDER NOTE - OBJECTIVE STATEMENT
56 y/o M patient with a significant PMHx of Bronchitis, CHF, Hyperlipidemia, HTN and a significant PSHx of AICD presents to the ED with positive blood cultures which were drawn on 9/27/18. Patient has Group B strep in both blood cultures. Patient is resistance to Clindamycin otherwise sensitive. Patient states he currently feels much better as opposed to his last ED visit on 9/27/18. Patient denies any other complaints. NKDA.

## 2018-10-01 NOTE — H&P ADULT - PROBLEM SELECTOR PLAN 1
no fever, vitals sable, no s/s, WBC: 7.7  Source unknown   Repeat Blood culture sent   Unasyn 3 mg q 6   ID : Dr Mensah, consulted

## 2018-10-01 NOTE — H&P ADULT - PMH
AICD (automatic cardioverter/defibrillator) present    CHF (congestive heart failure)    Hyperlipidemia    Hypertension

## 2018-10-01 NOTE — H&P ADULT - PROBLEM SELECTOR PLAN 6
IMPROVE VTE Individual Risk Assessment  RISK                                                                Points  [  ] Previous VTE                                                  3  [  ] Thrombophilia                                               2  [  ] Lower limb paralysis                                      2        (unable to hold up >15 seconds)    [  ] Current Cancer                                              2       (within 6 months)    [ x ] Immobilization > 24 hrs                                1  [  ] ICU/CCU stay > 24 hours                              1  [  ] Age > 60                                                      1    IMPROVE VTE Score _________1  Patient is on Eliquis 5mg BID for Afib recent interrogation in Aug at [facility]

## 2018-10-02 LAB
ALBUMIN SERPL ELPH-MCNC: 3.5 G/DL — SIGNIFICANT CHANGE UP (ref 3.5–5)
ALP SERPL-CCNC: 45 U/L — SIGNIFICANT CHANGE UP (ref 40–120)
ALT FLD-CCNC: 29 U/L DA — SIGNIFICANT CHANGE UP (ref 10–60)
ANION GAP SERPL CALC-SCNC: 9 MMOL/L — SIGNIFICANT CHANGE UP (ref 5–17)
AST SERPL-CCNC: 17 U/L — SIGNIFICANT CHANGE UP (ref 10–40)
BASOPHILS # BLD AUTO: 0.2 K/UL — SIGNIFICANT CHANGE UP (ref 0–0.2)
BASOPHILS NFR BLD AUTO: 2.1 % — HIGH (ref 0–2)
BILIRUB SERPL-MCNC: 0.6 MG/DL — SIGNIFICANT CHANGE UP (ref 0.2–1.2)
BUN SERPL-MCNC: 15 MG/DL — SIGNIFICANT CHANGE UP (ref 7–18)
CALCIUM SERPL-MCNC: 8.8 MG/DL — SIGNIFICANT CHANGE UP (ref 8.4–10.5)
CHLORIDE SERPL-SCNC: 108 MMOL/L — SIGNIFICANT CHANGE UP (ref 96–108)
CO2 SERPL-SCNC: 24 MMOL/L — SIGNIFICANT CHANGE UP (ref 22–31)
CREAT SERPL-MCNC: 1.43 MG/DL — HIGH (ref 0.5–1.3)
EOSINOPHIL # BLD AUTO: 0.4 K/UL — SIGNIFICANT CHANGE UP (ref 0–0.5)
EOSINOPHIL NFR BLD AUTO: 4.8 % — SIGNIFICANT CHANGE UP (ref 0–6)
GLUCOSE SERPL-MCNC: 101 MG/DL — HIGH (ref 70–99)
HCT VFR BLD CALC: 43.3 % — SIGNIFICANT CHANGE UP (ref 39–50)
HGB BLD-MCNC: 13.6 G/DL — SIGNIFICANT CHANGE UP (ref 13–17)
LYMPHOCYTES # BLD AUTO: 1.3 K/UL — SIGNIFICANT CHANGE UP (ref 1–3.3)
LYMPHOCYTES # BLD AUTO: 17.3 % — SIGNIFICANT CHANGE UP (ref 13–44)
MAGNESIUM SERPL-MCNC: 2.2 MG/DL — SIGNIFICANT CHANGE UP (ref 1.6–2.6)
MCHC RBC-ENTMCNC: 30.8 PG — SIGNIFICANT CHANGE UP (ref 27–34)
MCHC RBC-ENTMCNC: 31.4 GM/DL — LOW (ref 32–36)
MCV RBC AUTO: 98.2 FL — SIGNIFICANT CHANGE UP (ref 80–100)
MONOCYTES # BLD AUTO: 0.6 K/UL — SIGNIFICANT CHANGE UP (ref 0–0.9)
MONOCYTES NFR BLD AUTO: 8.3 % — SIGNIFICANT CHANGE UP (ref 2–14)
NEUTROPHILS # BLD AUTO: 5.2 K/UL — SIGNIFICANT CHANGE UP (ref 1.8–7.4)
NEUTROPHILS NFR BLD AUTO: 67.5 % — SIGNIFICANT CHANGE UP (ref 43–77)
PHOSPHATE SERPL-MCNC: 4 MG/DL — SIGNIFICANT CHANGE UP (ref 2.5–4.5)
PLATELET # BLD AUTO: 184 K/UL — SIGNIFICANT CHANGE UP (ref 150–400)
POTASSIUM SERPL-MCNC: 4.4 MMOL/L — SIGNIFICANT CHANGE UP (ref 3.5–5.3)
POTASSIUM SERPL-SCNC: 4.4 MMOL/L — SIGNIFICANT CHANGE UP (ref 3.5–5.3)
PROT SERPL-MCNC: 7.1 G/DL — SIGNIFICANT CHANGE UP (ref 6–8.3)
RBC # BLD: 4.41 M/UL — SIGNIFICANT CHANGE UP (ref 4.2–5.8)
RBC # FLD: 13.7 % — SIGNIFICANT CHANGE UP (ref 10.3–14.5)
SODIUM SERPL-SCNC: 141 MMOL/L — SIGNIFICANT CHANGE UP (ref 135–145)
WBC # BLD: 7.7 K/UL — SIGNIFICANT CHANGE UP (ref 3.8–10.5)
WBC # FLD AUTO: 7.7 K/UL — SIGNIFICANT CHANGE UP (ref 3.8–10.5)

## 2018-10-02 PROCEDURE — 76770 US EXAM ABDO BACK WALL COMP: CPT | Mod: 26

## 2018-10-02 RX ORDER — DIGOXIN 250 MCG
0.12 TABLET ORAL EVERY OTHER DAY
Qty: 0 | Refills: 0 | Status: DISCONTINUED | OUTPATIENT
Start: 2018-10-02 | End: 2018-10-05

## 2018-10-02 RX ADMIN — SPIRONOLACTONE 25 MILLIGRAM(S): 25 TABLET, FILM COATED ORAL at 05:48

## 2018-10-02 RX ADMIN — Medication 0.12 MILLIGRAM(S): at 17:48

## 2018-10-02 RX ADMIN — AMPICILLIN SODIUM AND SULBACTAM SODIUM 200 GRAM(S): 250; 125 INJECTION, POWDER, FOR SUSPENSION INTRAMUSCULAR; INTRAVENOUS at 17:49

## 2018-10-02 RX ADMIN — APIXABAN 5 MILLIGRAM(S): 2.5 TABLET, FILM COATED ORAL at 05:48

## 2018-10-02 RX ADMIN — AMIODARONE HYDROCHLORIDE 200 MILLIGRAM(S): 400 TABLET ORAL at 05:48

## 2018-10-02 RX ADMIN — Medication 81 MILLIGRAM(S): at 11:08

## 2018-10-02 RX ADMIN — AMPICILLIN SODIUM AND SULBACTAM SODIUM 200 GRAM(S): 250; 125 INJECTION, POWDER, FOR SUSPENSION INTRAMUSCULAR; INTRAVENOUS at 05:48

## 2018-10-02 RX ADMIN — Medication 0.12 MILLIGRAM(S): at 05:48

## 2018-10-02 RX ADMIN — SACUBITRIL AND VALSARTAN 1 TABLET(S): 24; 26 TABLET, FILM COATED ORAL at 05:48

## 2018-10-02 RX ADMIN — Medication 100 MILLIGRAM(S): at 05:48

## 2018-10-02 RX ADMIN — APIXABAN 5 MILLIGRAM(S): 2.5 TABLET, FILM COATED ORAL at 17:48

## 2018-10-02 RX ADMIN — AMPICILLIN SODIUM AND SULBACTAM SODIUM 200 GRAM(S): 250; 125 INJECTION, POWDER, FOR SUSPENSION INTRAMUSCULAR; INTRAVENOUS at 11:08

## 2018-10-02 RX ADMIN — Medication 20 MILLIGRAM(S): at 05:48

## 2018-10-02 RX ADMIN — ATORVASTATIN CALCIUM 20 MILLIGRAM(S): 80 TABLET, FILM COATED ORAL at 21:45

## 2018-10-02 RX ADMIN — AMPICILLIN SODIUM AND SULBACTAM SODIUM 200 GRAM(S): 250; 125 INJECTION, POWDER, FOR SUSPENSION INTRAMUSCULAR; INTRAVENOUS at 00:05

## 2018-10-02 RX ADMIN — SACUBITRIL AND VALSARTAN 1 TABLET(S): 24; 26 TABLET, FILM COATED ORAL at 17:49

## 2018-10-02 NOTE — CONSULT NOTE ADULT - SUBJECTIVE AND OBJECTIVE BOX
Chief complain and HPI:  56 yo M with PMH of CHF, HTN, AICD( since March 2015) was called to the ED because his blood cultures taken on his visit to ED on Thursday Oct 27, 2018 came back positive for group b Streptococcus agalactiae. On last Thursday, early morning patient was sitting and watching TV AT 5 AM , when he started feeling cold and his hands and legs starting shaking with chills. He was able to go to work but felt worse at work and brought to the ED. In ED blood culture were sent, broad spectrum dose was given and he was discharged from the ED at 2 pm. . When he went back home he felt better and the next day he felt completely fine.  His Blood culture came back positive for group B Strep and he was called back to the ED. Today he feels fine, no fever, no chills, no burning micturition no skin lesion. No sick contacts   PMH: CHF(ECHO: Aug 17 : severe left systolic function) with AICD placement in March 2015, Afib diagnosed in Aug 2017, HTN.     He is known to have cardiomyopathy and his medications  are from 2016 and there were no changes in the last few month.  His  appetite is OK and was OK in the last week.  He is on fluid restriction  diet at home.  He urinates well with the help of Lasix, with out lasix urine output reduced.    Renal consult was called for elevated creatinine, base line 1.27 to 1.3     Creatinine, Serum: 1.27 mg/dL (08.19.17 @ 07:24)  Creatinine, Serum: 1.54 mg/dL (09.27.18 @ 08:06)  Creatinine, Serum: 1.43 mg/dL (10.02.18 @ 07:36)    Fractional short: 16 %  EF (Teicholtz): 33 %  ------------------------------------------------------------------------  Observations:  Mitral Valve: Tethered mitral valve leaflets with normal  opening. Moderate-severe mitral regurgitation.  Aortic Valve/Aorta: Normal trileaflet aortic valve.  Aortic Root: 3.5 cm.  Left Atrium: Moderately dilated left atrium.  LA volume  index = 47 cc/m2. No left atrial or left atrial appendage  thrombus.  Left Ventricle: Severe global left ventricular systolic  dysfunction. Eccentric left ventricular hypertrophy  (dilated left ventricle with normal relative wall  thickness).  Right Heart: Severe right atrial enlargement. A device wire  is noted in the right heart. The right ventricle is not  well visualized; grossly reduced  right ventricular  systolic function. Normal tricuspid valve. Mild tricuspid  regurgitation. Normal pulmonic valve.  Pericardium/Pleura: Normal pericardium with no pericardial  effusion.  Hemodynamic: Agitated saline injection demonstrates no  evidence of a patent foramen ovale.  ------------------------------------------------------------------------  Conclusions:  1. Tethered mitral valve leaflets with normal opening.  Moderate-severe mitral regurgitation.  2. Moderately dilated left atrium.  LA volume index = 47  cc/m2. No left atrial or left atrial appendage thrombus.  3. Eccentric left ventricular hypertrophy (dilated left  ventricle with normal relative wall thickness).  4. Severe global left ventricular systolic dysfunction.  5. Severe right atrial enlargement. A device wire is noted  in the right heart.  6. The right ventricle is not well visualized; grossly  reduced  right ventricular systolic function.  *** No previous Echo exam.  ------------------------------------------------------------------------  Confirmed on  8/16/2017 - 18:02:57 by Lila Lopez M.D.  ------------------------------------------------------------------------      EXAM:  XR CHEST PA LAT 2V                            PROCEDURE DATE:  09/27/2018          INTERPRETATION:  Difficulty breathing.    PA lateral. Prior 8/14/2017.    Left cardiac pacer in position. No change borderline to mild   cardiomegaly. No consolidation or effusion.    Impression: As above            PAST MEDICAL & SURGICAL HISTORY:  AICD (automatic cardioverter/defibrillator) present  Hyperlipidemia  CHF (congestive heart failure)  Hypertension  AICD (automatic cardioverter/defibrillator) present      Home Medications Reviewed    Hospital Medications:   MEDICATIONS  (STANDING):  amiodarone    Tablet 200 milliGRAM(s) Oral daily  ampicillin/sulbactam  IVPB 3 Gram(s) IV Intermittent every 6 hours  apixaban 5 milliGRAM(s) Oral every 12 hours  aspirin  chewable 81 milliGRAM(s) Oral daily  atorvastatin 20 milliGRAM(s) Oral at bedtime  digoxin     Tablet 0.125 milliGRAM(s) Oral every other day  furosemide    Tablet 20 milliGRAM(s) Oral daily  metoprolol succinate  milliGRAM(s) Oral daily  sacubitril 97 mG/valsartan 103 mG 1 Tablet(s) Oral two times a day  spironolactone 25 milliGRAM(s) Oral daily    MEDICATIONS  (PRN):      Allergies    No Known Allergies    Intolerances                              13.6   7.7   )-----------( 184      ( 02 Oct 2018 07:36 )             43.3     10-02    141  |  108  |  15  ----------------------------<  101<H>  4.4   |  24  |  1.43<H>    Ca    8.8      02 Oct 2018 07:36  Phos  4.0     10-02  Mg     2.2     10-02    TPro  7.1  /  Alb  3.5  /  TBili  0.6  /  DBili  x   /  AST  17  /  ALT  29  /  AlkPhos  45  10-02        Culture - Urine (09.27.18 @ 14:21)    Specimen Source: .Urine Clean Catch (Midstream)    Culture Results:   No growth    Urinalysis + Microscopic Examination (09.27.18 @ 08:06)    Urine Appearance: Clear    Urobilinogen: Negative    Specific Gravity: 1.010    Protein, Urine: 30 mg/dL    pH Urine: 5.0    Leukocyte Esterase Concentration: Negative    Nitrite: Negative    Ketone - Urine: Negative    Bilirubin: Negative    Color: Yellow    Glucose Qualitative, Urine: Negative    Blood, Urine: Moderate    Red Blood Cell - Urine: 0-2 /HPF    Epithelial Cells: Occasional /HPF          RADIOLOGY & ADDITIONAL STUDIES:    SOCIAL HISTORY: Denies ETOh,Smoking,     FAMILY HISTORY:  Family history of cardiac arrest (Sibling): sudeen cardiac arrest in sister at the age of 42 and multiple othre family members      REVIEW OF SYSTEMS:  CONSTITUTIONAL: No malaise, No fatigue  RESPIRATORY: No cough, wheezing, hemoptysis; No shortness of breath  CARDIOVASCULAR: No chest pain or palpitations. No edema  GASTROINTESTINAL: No abdominal or epigastric pain. No nausea, vomiting, or hematemesis; No diarrhea or constipation. No melena or hematochezia.  GENITOURINARY: No dysuria, frequency, foamy urine, urinary urgency, incontinence or hematuria  NEUROLOGICAL: No numbness or weakness, No tremor  SKIN: No itching, burning, rashes, or lesions   VASCULAR: No claudication  Musculoskeletal: no arthralgia, no myalgia  He c/o excessive sweats when he had fever  on the 27th but not now.  No chills and no shaking from the 27th after he left the ER.      VITALS:  Vital Signs Last 24 Hrs  T(C): 36.3 (02 Oct 2018 05:43), Max: 36.9 (01 Oct 2018 20:25)  T(F): 97.3 (02 Oct 2018 05:43), Max: 98.5 (01 Oct 2018 20:25)  HR: 67 (02 Oct 2018 05:43) (67 - 86)  BP: 117/74 (02 Oct 2018 05:43) (117/74 - 152/92)  BP(mean): --  RR: 16 (02 Oct 2018 05:43) (16 - 18)  SpO2: 100% (02 Oct 2018 05:43) (96% - 100%)        PHYSICAL EXAM:  Constitutional: NAD  HEENT: anicteric sclera, oropharynx clear, MMM  Neck: No JVD  Respiratory: good air entrance B/L, no wheezes, rales or rhonchi  Cardiovascular: S1, S2, RRR, no pericardial rub, no murmur  Gastrointestinal: BS+, soft, no tenderness, no distension, no bruit  Pelvis: bladder non-distended, no CVA tenderness  Extremities: No cyanosis or clubbing. No peripheral edema  Neurological: A/O x 3, no focal deficits  Psychiatric: Normal mood, normal affect  : No CVA tenderness. No hayden.     Vascular: all pulses present

## 2018-10-02 NOTE — CONSULT NOTE ADULT - GASTROINTESTINAL DETAILS
nontender/no masses palpable/soft/bowel sounds normal/no organomegaly/no rigidity/no guarding/no distention

## 2018-10-02 NOTE — CONSULT NOTE ADULT - SUBJECTIVE AND OBJECTIVE BOX
HPI:  56 yo M with PMH of CHF, HTN, AICD( since March 2015) was called to the ED because his blood cultures taken on his visit to ED on Thursday Oct 27, 2018 came back positive for group b Streptococcus agalactiae. On last Thursday, early morning patient was sitting and watching TV, when he started feeling cold and his hands and legs starting shaking with chills. He was able to go to work but felt worse at work and brought to the ED. In ED blood culture were sent, broad spectrum dose was given and he was discharged from the ED. When he went back home he felt better and the next day he felt completely fine.  His Blood culture came back positive for group B Strep and he was called back to the ED. Today he feels fine, no fever, no chills, no burning micturition no skin lesion. No sick contacts   PMH: CHF(ECHO: Aug 17 : severe left systolic function) with AICD placement in March 2015, Afib diagnosed in Aug 2017, HTN.   Pt recently in Aug  went to see his cardiologist, his AICD was interrogated, EP study was done  and he was recommended to get ablation done for Afib at [facility].   FH: sudden cardiac death in multiple family members(sister at the age 42, uncles, grandfather on both sides, according to the patient)   SH: social drinking, 1-2 times a day, chronic smoker, half a pack a day       PAST MEDICAL & SURGICAL HISTORY:  AICD (automatic cardioverter/defibrillator) present  Hyperlipidemia  CHF (congestive heart failure)  Hypertension  AICD (automatic cardioverter/defibrillator) present      No Known Allergies      Meds:  amiodarone    Tablet 200 milliGRAM(s) Oral daily  ampicillin/sulbactam  IVPB 3 Gram(s) IV Intermittent every 6 hours  apixaban 5 milliGRAM(s) Oral every 12 hours  aspirin  chewable 81 milliGRAM(s) Oral daily  atorvastatin 20 milliGRAM(s) Oral at bedtime  digoxin     Tablet 0.125 milliGRAM(s) Oral every other day  furosemide    Tablet 20 milliGRAM(s) Oral daily  metoprolol succinate  milliGRAM(s) Oral daily  sacubitril 97 mG/valsartan 103 mG 1 Tablet(s) Oral two times a day  spironolactone 25 milliGRAM(s) Oral daily      SOCIAL HISTORY: as above    FAMILY HISTORY:  Family history of cardiac arrest (Sibling): sudeen cardiac arrest in sister at the age of 42 and multiple othre family members      VITALS:  Vital Signs Last 24 Hrs  T(C): 36.3 (02 Oct 2018 05:43), Max: 36.9 (01 Oct 2018 20:25)  T(F): 97.3 (02 Oct 2018 05:43), Max: 98.5 (01 Oct 2018 20:25)  HR: 67 (02 Oct 2018 05:43) (67 - 86)  BP: 117/74 (02 Oct 2018 05:43) (117/74 - 152/92)  BP(mean): --  RR: 16 (02 Oct 2018 05:43) (16 - 18)  SpO2: 100% (02 Oct 2018 05:43) (96% - 100%)    LABS/DIAGNOSTIC TESTS:                          13.6   7.7   )-----------( 184      ( 02 Oct 2018 07:36 )             43.3     WBC Count: 7.7 K/uL (10-02 @ 07:36)  WBC Count: 7.3 K/uL (10-01 @ 16:49)      10-02    141  |  108  |  15  ----------------------------<  101<H>  4.4   |  24  |  1.43<H>    Ca    8.8      02 Oct 2018 07:36  Phos  4.0     10-02  Mg     2.2     10-02    TPro  7.1  /  Alb  3.5  /  TBili  0.6  /  DBili  x   /  AST  17  /  ALT  29  /  AlkPhos  45  10-02          LIVER FUNCTIONS - ( 02 Oct 2018 07:36 )  Alb: 3.5 g/dL / Pro: 7.1 g/dL / ALK PHOS: 45 U/L / ALT: 29 U/L DA / AST: 17 U/L / GGT: x                 LACTATE:Lactate, Blood: 1.6 mmol/L (10-01 @ 16:49)      ABG -     CULTURES:   .Urine Clean Catch (Midstream)  09-27 @ 14:21   No growth  --  --      .Blood Blood-Peripheral  09-27 @ 13:42   Growth in aerobic bottle: Streptococcus agalactiae (Group B)  See previous culture [id number]  --  Blood Culture PCR  Streptococcus agalactiae (Group B)          RADIOLOGY:< from: Xray Chest 2 Views PA/Lat (09.27.18 @ 09:44) >  EXAM:  XR CHEST PA LAT 2V                            PROCEDURE DATE:  09/27/2018          INTERPRETATION:  Difficulty breathing.    PA lateral. Prior 8/14/2017.    Left cardiac pacer in position. No change borderline to mild   cardiomegaly. No consolidation or effusion.    Impression: As above    < end of copied text >        ROS  [  ] UNABLE TO ELICIT HPI:  58 yo M with PMH of CHF, HTN, AICD( since March 2015) was called to the ED because his blood cultures taken on his visit to ED on Thursday Oct 27, 2018 came back positive for group b Streptococcus agalactiae. On last Thursday, early morning patient was sitting and watching TV, when he started feeling cold and his hands and legs starting shaking with chills. He was able to go to work but felt worse at work and brought to the ED. In ED blood culture were sent, broad spectrum dose was given and he was discharged from the ED. When he went back home he felt better and the next day he felt completely fine.  His Blood culture came back positive for group B Strep and he was called back to the ED. Today he feels fine, no fever, no chills, no burning micturition, No sick contacts.  He has no active complaints at this time and all his symptoms started on the Thursday he came to the ER. No one at home is sick, no recent travel, no dental work, has a cat at home but has not scratched or bit him.        PMH: CHF(ECHO: Aug 17 : severe left systolic function) with AICD placement in March 2015, Afib diagnosed in Aug 2017, HTN.   Pt recently in Aug  went to see his cardiologist, his AICD was interrogated, EP study was done  and he was recommended to get ablation done for [facility]ib at [facility].   FH: sudden cardiac death in multiple family members(sister at the age 42, uncles, grandfather on both sides, according to the patient)   SH: social drinking, 1-2 times a day, chronic smoker, half a pack a day       PAST MEDICAL & SURGICAL HISTORY:  AICD (automatic cardioverter/defibrillator) present  Hyperlipidemia  CHF (congestive heart failure)  Hypertension  AICD (automatic cardioverter/defibrillator) present      No Known Allergies      Meds:  amiodarone    Tablet 200 milliGRAM(s) Oral daily  ampicillin/sulbactam  IVPB 3 Gram(s) IV Intermittent every 6 hours  apixaban 5 milliGRAM(s) Oral every 12 hours  aspirin  chewable 81 milliGRAM(s) Oral daily  atorvastatin 20 milliGRAM(s) Oral at bedtime  digoxin     Tablet 0.125 milliGRAM(s) Oral every other day  furosemide    Tablet 20 milliGRAM(s) Oral daily  metoprolol succinate  milliGRAM(s) Oral daily  sacubitril 97 mG/valsartan 103 mG 1 Tablet(s) Oral two times a day  spironolactone 25 milliGRAM(s) Oral daily      SOCIAL HISTORY: as above    FAMILY HISTORY:  Family history of cardiac arrest (Sibling): sudeen cardiac arrest in sister at the age of 42 and multiple othre family members      VITALS:  Vital Signs Last 24 Hrs  T(C): 36.3 (02 Oct 2018 05:43), Max: 36.9 (01 Oct 2018 20:25)  T(F): 97.3 (02 Oct 2018 05:43), Max: 98.5 (01 Oct 2018 20:25)  HR: 67 (02 Oct 2018 05:43) (67 - 86)  BP: 117/74 (02 Oct 2018 05:43) (117/74 - 152/92)  BP(mean): --  RR: 16 (02 Oct 2018 05:43) (16 - 18)  SpO2: 100% (02 Oct 2018 05:43) (96% - 100%)    LABS/DIAGNOSTIC TESTS:                          13.6   7.7   )-----------( 184      ( 02 Oct 2018 07:36 )             43.3     WBC Count: 7.7 K/uL (10-02 @ 07:36)  WBC Count: 7.3 K/uL (10-01 @ 16:49)      10-02    141  |  108  |  15  ----------------------------<  101<H>  4.4   |  24  |  1.43<H>    Ca    8.8      02 Oct 2018 07:36  Phos  4.0     10-02  Mg     2.2     10-02    TPro  7.1  /  Alb  3.5  /  TBili  0.6  /  DBili  x   /  AST  17  /  ALT  29  /  AlkPhos  45  10-02          LIVER FUNCTIONS - ( 02 Oct 2018 07:36 )  Alb: 3.5 g/dL / Pro: 7.1 g/dL / ALK PHOS: 45 U/L / ALT: 29 U/L DA / AST: 17 U/L / GGT: x                 LACTATE:Lactate, Blood: 1.6 mmol/L (10-01 @ 16:49)      ABG -     CULTURES:   .Urine Clean Catch (Midstream)  09-27 @ 14:21   No growth  --  --      .Blood Blood-Peripheral  09-27 @ 13:42   Growth in aerobic bottle: Streptococcus agalactiae (Group B)  See previous culture [id number]  --  Blood Culture PCR  Streptococcus agalactiae (Group B)          RADIOLOGY:< from: Xray Chest 2 Views PA/Lat (09.27.18 @ 09:44) >  EXAM:  XR CHEST PA LAT 2V                            PROCEDURE DATE:  09/27/2018          INTERPRETATION:  Difficulty breathing.    PA lateral. Prior 8/14/2017.    Left cardiac pacer in position. No change borderline to mild   cardiomegaly. No consolidation or effusion.    Impression: As above    < end of copied text >        ROS  [  ] UNABLE TO ELICIT

## 2018-10-02 NOTE — CONSULT NOTE ADULT - REASON FOR ADMISSION
positive blood culture taken during ED visit on Thursday
positive blood culture taken during ED visit on Thursday

## 2018-10-02 NOTE — PROGRESS NOTE ADULT - SUBJECTIVE AND OBJECTIVE BOX
NP Note discussed with  Primary Attending    Patient is a 57y old  Male who presents with a chief complaint sent back to hospital with positive group B streptococcus agalactiae on BC at ED visit of 9/27.      INTERVAL HPI/OVERNIGHT EVENTS: no new complaints    MEDICATIONS  (STANDING):  amiodarone    Tablet 200 milliGRAM(s) Oral daily  ampicillin/sulbactam  IVPB 3 Gram(s) IV Intermittent every 6 hours  apixaban 5 milliGRAM(s) Oral every 12 hours  aspirin  chewable 81 milliGRAM(s) Oral daily  atorvastatin 20 milliGRAM(s) Oral at bedtime  digoxin     Tablet 0.125 milliGRAM(s) Oral every other day  furosemide    Tablet 20 milliGRAM(s) Oral daily  metoprolol succinate  milliGRAM(s) Oral daily  sacubitril 97 mG/valsartan 103 mG 1 Tablet(s) Oral two times a day  spironolactone 25 milliGRAM(s) Oral daily    MEDICATIONS  (PRN):      __________________________________________________  REVIEW OF SYSTEMS:    CONSTITUTIONAL: No fever,   EYES: no acute visual disturbances  NECK: No pain or stiffness  RESPIRATORY: No cough; No shortness of breath  CARDIOVASCULAR: No chest pain, no palpitations  GASTROINTESTINAL: No pain. No nausea or vomiting; No diarrhea   NEUROLOGICAL: No headache or numbness, no tremors  MUSCULOSKELETAL: No joint pain, no muscle pain  GENITOURINARY: no dysuria, no frequency, no hesitancy  PSYCHIATRY: no depression , no anxiety  ALL OTHER  ROS negative        Vital Signs Last 24 Hrs  T(C): 36.3 (02 Oct 2018 05:43), Max: 36.9 (01 Oct 2018 20:25)  T(F): 97.3 (02 Oct 2018 05:43), Max: 98.5 (01 Oct 2018 20:25)  HR: 67 (02 Oct 2018 05:43) (67 - 86)  BP: 117/74 (02 Oct 2018 05:43) (117/74 - 152/92)  BP(mean): --  RR: 16 (02 Oct 2018 05:43) (16 - 18)  SpO2: 100% (02 Oct 2018 05:43) (96% - 100%)    ________________________________________________  PHYSICAL EXAM:  GENERAL: NAD  HEENT: Normocephalic;  conjunctivae and sclerae clear; moist mucous membranes;   NECK : supple  CHEST/LUNG: Clear to auscultation bilaterally with good air entry   HEART: S1 S2  regular; no murmurs, gallops or rubs  ABDOMEN: Soft, Nontender, Nondistended; Bowel sounds present  EXTREMITIES: no cyanosis; 1-2+ BL edema; no calf tenderness  SKIN: warm and dry; no rash  NERVOUS SYSTEM:  Awake and alert; Oriented  to place, person and time ; no new deficits    _________________________________________________  LABS:                        13.6   7.7   )-----------( 184      ( 02 Oct 2018 07:36 )             43.3     10-02    141  |  108  |  15  ----------------------------<  101<H>  4.4   |  24  |  1.43<H>    Ca    8.8      02 Oct 2018 07:36  Phos  4.0     10-02  Mg     2.2     10-02    TPro  7.1  /  Alb  3.5  /  TBili  0.6  /  DBili  x   /  AST  17  /  ALT  29  /  AlkPhos  45  10-02        CAPILLARY BLOOD GLUCOSE            RADIOLOGY & ADDITIONAL TESTS:    Imaging Personally Reviewed:  YES    Consultant(s) Notes Reviewed:   YES    Care Discussed with Consultants :     Plan of care was discussed with patient and /or primary care giver; all questions and concerns were addressed and care was aligned with patient's wishes.

## 2018-10-02 NOTE — CONSULT NOTE ADULT - ASSESSMENT
KIARA in a patient with Cardiomyopathy on Entresto and diuretics  Fever on the 27th with rigorous chills.  Max temp was 102, bp was 185/100 and  on th e27th ofvesptember    History of CHF , EF 25%.  Valvular disease and left/right heart failure.    Most likely cause is sepsis.    Continue current treatments.  Follow daily renal function.    Follow renal and bladder US
Bacteremia with group B strep  likely source is left leg cellulitis

## 2018-10-03 DIAGNOSIS — N40.0 BENIGN PROSTATIC HYPERPLASIA WITHOUT LOWER URINARY TRACT SYMPTOMS: ICD-10-CM

## 2018-10-03 DIAGNOSIS — E78.5 HYPERLIPIDEMIA, UNSPECIFIED: ICD-10-CM

## 2018-10-03 LAB
ALBUMIN SERPL ELPH-MCNC: 3.7 G/DL — SIGNIFICANT CHANGE UP (ref 3.5–5)
ALP SERPL-CCNC: 48 U/L — SIGNIFICANT CHANGE UP (ref 40–120)
ALT FLD-CCNC: 28 U/L DA — SIGNIFICANT CHANGE UP (ref 10–60)
ANION GAP SERPL CALC-SCNC: 6 MMOL/L — SIGNIFICANT CHANGE UP (ref 5–17)
AST SERPL-CCNC: 15 U/L — SIGNIFICANT CHANGE UP (ref 10–40)
BILIRUB SERPL-MCNC: 0.7 MG/DL — SIGNIFICANT CHANGE UP (ref 0.2–1.2)
BUN SERPL-MCNC: 16 MG/DL — SIGNIFICANT CHANGE UP (ref 7–18)
CALCIUM SERPL-MCNC: 8.8 MG/DL — SIGNIFICANT CHANGE UP (ref 8.4–10.5)
CHLORIDE SERPL-SCNC: 108 MMOL/L — SIGNIFICANT CHANGE UP (ref 96–108)
CO2 SERPL-SCNC: 24 MMOL/L — SIGNIFICANT CHANGE UP (ref 22–31)
CREAT SERPL-MCNC: 1.37 MG/DL — HIGH (ref 0.5–1.3)
GLUCOSE SERPL-MCNC: 109 MG/DL — HIGH (ref 70–99)
HCT VFR BLD CALC: 44.8 % — SIGNIFICANT CHANGE UP (ref 39–50)
HGB BLD-MCNC: 14.1 G/DL — SIGNIFICANT CHANGE UP (ref 13–17)
MCHC RBC-ENTMCNC: 30.9 PG — SIGNIFICANT CHANGE UP (ref 27–34)
MCHC RBC-ENTMCNC: 31.5 GM/DL — LOW (ref 32–36)
MCV RBC AUTO: 98.2 FL — SIGNIFICANT CHANGE UP (ref 80–100)
PLATELET # BLD AUTO: 190 K/UL — SIGNIFICANT CHANGE UP (ref 150–400)
POTASSIUM SERPL-MCNC: 4.3 MMOL/L — SIGNIFICANT CHANGE UP (ref 3.5–5.3)
POTASSIUM SERPL-SCNC: 4.3 MMOL/L — SIGNIFICANT CHANGE UP (ref 3.5–5.3)
PROT SERPL-MCNC: 7.2 G/DL — SIGNIFICANT CHANGE UP (ref 6–8.3)
RBC # BLD: 4.56 M/UL — SIGNIFICANT CHANGE UP (ref 4.2–5.8)
RBC # FLD: 13.4 % — SIGNIFICANT CHANGE UP (ref 10.3–14.5)
SODIUM SERPL-SCNC: 138 MMOL/L — SIGNIFICANT CHANGE UP (ref 135–145)
WBC # BLD: 8.5 K/UL — SIGNIFICANT CHANGE UP (ref 3.8–10.5)
WBC # FLD AUTO: 8.5 K/UL — SIGNIFICANT CHANGE UP (ref 3.8–10.5)

## 2018-10-03 RX ADMIN — Medication 81 MILLIGRAM(S): at 13:05

## 2018-10-03 RX ADMIN — AMPICILLIN SODIUM AND SULBACTAM SODIUM 200 GRAM(S): 250; 125 INJECTION, POWDER, FOR SUSPENSION INTRAMUSCULAR; INTRAVENOUS at 23:27

## 2018-10-03 RX ADMIN — APIXABAN 5 MILLIGRAM(S): 2.5 TABLET, FILM COATED ORAL at 17:46

## 2018-10-03 RX ADMIN — ATORVASTATIN CALCIUM 20 MILLIGRAM(S): 80 TABLET, FILM COATED ORAL at 21:54

## 2018-10-03 RX ADMIN — APIXABAN 5 MILLIGRAM(S): 2.5 TABLET, FILM COATED ORAL at 06:19

## 2018-10-03 RX ADMIN — AMPICILLIN SODIUM AND SULBACTAM SODIUM 200 GRAM(S): 250; 125 INJECTION, POWDER, FOR SUSPENSION INTRAMUSCULAR; INTRAVENOUS at 06:22

## 2018-10-03 RX ADMIN — AMPICILLIN SODIUM AND SULBACTAM SODIUM 200 GRAM(S): 250; 125 INJECTION, POWDER, FOR SUSPENSION INTRAMUSCULAR; INTRAVENOUS at 00:08

## 2018-10-03 RX ADMIN — SPIRONOLACTONE 25 MILLIGRAM(S): 25 TABLET, FILM COATED ORAL at 06:19

## 2018-10-03 RX ADMIN — Medication 100 MILLIGRAM(S): at 06:19

## 2018-10-03 RX ADMIN — Medication 20 MILLIGRAM(S): at 06:19

## 2018-10-03 RX ADMIN — SACUBITRIL AND VALSARTAN 1 TABLET(S): 24; 26 TABLET, FILM COATED ORAL at 17:46

## 2018-10-03 RX ADMIN — AMIODARONE HYDROCHLORIDE 200 MILLIGRAM(S): 400 TABLET ORAL at 06:19

## 2018-10-03 RX ADMIN — AMPICILLIN SODIUM AND SULBACTAM SODIUM 200 GRAM(S): 250; 125 INJECTION, POWDER, FOR SUSPENSION INTRAMUSCULAR; INTRAVENOUS at 17:46

## 2018-10-03 RX ADMIN — SACUBITRIL AND VALSARTAN 1 TABLET(S): 24; 26 TABLET, FILM COATED ORAL at 06:19

## 2018-10-03 RX ADMIN — AMPICILLIN SODIUM AND SULBACTAM SODIUM 200 GRAM(S): 250; 125 INJECTION, POWDER, FOR SUSPENSION INTRAMUSCULAR; INTRAVENOUS at 13:05

## 2018-10-03 NOTE — PROGRESS NOTE ADULT - SUBJECTIVE AND OBJECTIVE BOX
57y Male is under our care for     REVIEW OF SYSTEMS:  [  ] Not able to illicit  General:	  Chest:	  GI:	  :  Skin:	  Musculoskeletal:	  Neuro:	    MEDS:  ampicillin/sulbactam  IVPB 3 Gram(s) IV Intermittent every 6 hours    ALLERGIES: Allergies    No Known Allergies    Intolerances        VITALS:  Vital Signs Last 24 Hrs  T(C): 36.3 (03 Oct 2018 05:04), Max: 37 (02 Oct 2018 18:35)  T(F): 97.3 (03 Oct 2018 05:04), Max: 98.6 (02 Oct 2018 18:35)  HR: 73 (03 Oct 2018 05:04) (70 - 100)  BP: 146/91 (03 Oct 2018 05:04) (119/51 - 146/91)  BP(mean): --  RR: 14 (03 Oct 2018 05:04) (14 - 17)  SpO2: 100% (03 Oct 2018 05:04) (98% - 100%)      PHYSICAL EXAM:  HEENT:  Neck:  Respiratory:  Cardiovascular:  Gastrointestinal:  Extremities:  Skin:  Ortho:  Neuro:    LABS/DIAGNOSTIC TESTS:                        14.1   8.5   )-----------( 190      ( 03 Oct 2018 07:31 )             44.8     WBC Count: 8.5 K/uL (10-03 @ 07:31)  WBC Count: 7.7 K/uL (10-02 @ 07:36)  WBC Count: 7.3 K/uL (10-01 @ 16:49)    10-03    138  |  108  |  16  ----------------------------<  109<H>  4.3   |  24  |  1.37<H>    Ca    8.8      03 Oct 2018 07:31  Phos  4.0     10-02  Mg     2.2     10-02    TPro  7.2  /  Alb  3.7  /  TBili  0.7  /  DBili  x   /  AST  15  /  ALT  28  /  AlkPhos  48  10-03      CULTURES:   .Blood Blood-Peripheral  10-01 @ 20:10   No growth to date.  --  --      .Urine Clean Catch (Midstream)  09-27 @ 14:21   No growth  --  --      .Blood Blood-Peripheral  09-27 @ 13:42   Growth in aerobic bottle: Streptococcus agalactiae (Group B)  See previous culture [id number]  --  Blood Culture PCR  Streptococcus agalactiae (Group B)        RADIOLOGY:  no new studies 57y Male is under our care for group b strep bacteremia and mild left leg cellulitis. Patient is doing well and has no complaints at this time.  Repeat BC from 10/01 are negative so far. Another BC set will be done today. Remains afebrile with normal wbc count. Dc planning for Friday on PO antibiotics.     REVIEW OF SYSTEMS:  [  ] Not able to illicit  General: no fevers no malaise  Chest: no cough no sob  GI: no nvd  : no urinary sxs   Skin: no rashes  Musculoskeletal: no trauma no LBP  Neuro: no ha's no dizziness     MEDS:  ampicillin/sulbactam  IVPB 3 Gram(s) IV Intermittent every 6 hours    ALLERGIES: No Known Allergies    VITALS:  Vital Signs Last 24 Hrs  T(C): 36.3 (03 Oct 2018 05:04), Max: 37 (02 Oct 2018 18:35)  T(F): 97.3 (03 Oct 2018 05:04), Max: 98.6 (02 Oct 2018 18:35)  HR: 73 (03 Oct 2018 05:04) (70 - 100)  BP: 146/91 (03 Oct 2018 05:04) (119/51 - 146/91)  BP(mean): --  RR: 14 (03 Oct 2018 05:04) (14 - 17)  SpO2: 100% (03 Oct 2018 05:04) (98% - 100%)      PHYSICAL EXAM:  HEENT: n/a  Neck: supple no LN's   Respiratory: lungs clear no rales  Cardiovascular: S1 S2 reg no murmurs  Gastrointestinal: +BS with soft, nondistended abdomen; nontender  Extremities: trace pitting edema of left lower leg   Skin: faint warmth of left shin, no break in skin or erythema    Ortho: n/a  Neuro: AAO x 3      LABS/DIAGNOSTIC TESTS:                        14.1   8.5   )-----------( 190      ( 03 Oct 2018 07:31 )             44.8     WBC Count: 8.5 K/uL (10-03 @ 07:31)  WBC Count: 7.7 K/uL (10-02 @ 07:36)  WBC Count: 7.3 K/uL (10-01 @ 16:49)    10-03    138  |  108  |  16  ----------------------------<  109<H>  4.3   |  24  |  1.37<H>    Ca    8.8      03 Oct 2018 07:31  Phos  4.0     10-02  Mg     2.2     10-02    TPro  7.2  /  Alb  3.7  /  TBili  0.7  /  DBili  x   /  AST  15  /  ALT  28  /  AlkPhos  48  10-03      CULTURES:   10/03 BC - ordered    .Blood Blood-Peripheral  10-01 @ 20:10   No growth to date.  --  --      .Urine Clean Catch (Midstream)  09-27 @ 14:21   No growth  --  --      .Blood Blood-Peripheral  09-27 @ 13:42   Growth in aerobic bottle: Streptococcus agalactiae (Group B)  See previous culture [id number]  --  Blood Culture PCR  Streptococcus agalactiae (Group B)        RADIOLOGY:  no new studies 57y Male is under our care for group b strep bacteremia and mild left leg cellulitis. Patient is doing well and has no complaints at this time.  Repeat BC from 10/01 are negative so far. Another BC set will be done today. Remains afebrile with normal wbc count. Dc planning for Friday on PO antibiotics.     REVIEW OF SYSTEMS:  [  ] Not able to illicit  General: no fevers no malaise  Chest: no cough no sob  GI: no nvd  : no urinary sxs   Skin: no rashes  Musculoskeletal: no trauma no LBP  Neuro: no ha's no dizziness     MEDS:  ampicillin/sulbactam  IVPB 3 Gram(s) IV Intermittent every 6 hours    ALLERGIES: No Known Allergies    VITALS:  Vital Signs Last 24 Hrs  T(C): 36.3 (03 Oct 2018 05:04), Max: 37 (02 Oct 2018 18:35)  T(F): 97.3 (03 Oct 2018 05:04), Max: 98.6 (02 Oct 2018 18:35)  HR: 73 (03 Oct 2018 05:04) (70 - 100)  BP: 146/91 (03 Oct 2018 05:04) (119/51 - 146/91)  BP(mean): --  RR: 14 (03 Oct 2018 05:04) (14 - 17)  SpO2: 100% (03 Oct 2018 05:04) (98% - 100%)      PHYSICAL EXAM:  HEENT: n/a  Neck: supple no LN's   Respiratory: lungs clear no rales  Cardiovascular: S1 S2 reg no murmurs  Gastrointestinal: +BS with soft, nondistended abdomen; nontender  Extremities: trace pitting edema of left lower leg   Skin: faint warmth of left shin, no break in skin or erythema    Ortho: n/a  Neuro: AAO x 3      LABS/DIAGNOSTIC TESTS:                        14.1   8.5   )-----------( 190      ( 03 Oct 2018 07:31 )             44.8     WBC Count: 8.5 K/uL (10-03 @ 07:31)  WBC Count: 7.7 K/uL (10-02 @ 07:36)  WBC Count: 7.3 K/uL (10-01 @ 16:49)    10-03    138  |  108  |  16  ----------------------------<  109<H>  4.3   |  24  |  1.37<H>    Ca    8.8      03 Oct 2018 07:31  Phos  4.0     10-02  Mg     2.2     10-02    TPro  7.2  /  Alb  3.7  /  TBili  0.7  /  DBili  x   /  AST  15  /  ALT  28  /  AlkPhos  48  10-03      CULTURES:   10/03 BC - ordered    .Blood Blood-Peripheral  10-01 @ 20:10   No growth to date.  --  --      .Urine Clean Catch (Midstream)  09-27 @ 14:21   No growth  --  --      .Blood Blood-Peripheral  09-27 @ 13:42   Growth in aerobic bottle: Streptococcus agalactiae (Group B)  See previous culture [id number]  --  Blood Culture PCR  Streptococcus agalactiae (Group B)        RADIOLOGY:    EXAM:  US URINARY BLADDER                        EXAM:  US KIDNEY(S)                        PROCEDURE DATE:  10/02/2018    INTERPRETATION:  CLINICAL INFORMATION: Bacteremia.    COMPARISON: None available.    TECHNIQUE: Sonography of thekidneys and bladder.     FINDINGS:    Right kidney:  11.3 cm. No renal mass, hydronephrosis or calculi.   Sonolucent lower pole cyst measures 4.4 x 4.3 x 4.4 cm.    Left kidney:  11.7 cm. No renal mass, hydronephrosis or calculi.   Sonolucent midpolecyst measures 1.9 x 1.8 x 1.7 cm.    Urinary bladder: Within normal limits.    Prevoid volume 98 cc. No post void residual volume.    Prostate gland measures 5.4 x 4.1 x 4.5 cm    IMPRESSION:     No obstructive uropathy. Enlarged prostate gland.    Bilateral renal cysts

## 2018-10-03 NOTE — PROGRESS NOTE ADULT - SUBJECTIVE AND OBJECTIVE BOX
Problem List:  KIARA  Cardiomyopathy, AICD.  Streptococcus bacteremia    Right kidney:  11.3 cm. No renal mass, hydronephrosis or calculi.   Sonolucent lower pole cyst measures 4.4 x 4.3 x 4.4 cm.    Left kidney:  11.7 cm. No renal mass, hydronephrosis or calculi.   Sonolucent midpolecyst measures 1.9 x 1.8 x 1.7 cm.    Urinary bladder: Within normal limits.    Prevoid volume 98 cc. No post void residual volume.    Prostate gland measures 5.4 x 4.1 x 4.5 cm    IMPRESSION:     No obstructive uropathy. Enlarged prostate gland.    Bilateral renal cysts        PAST MEDICAL & SURGICAL HISTORY:  AICD (automatic cardioverter/defibrillator) present  Hyperlipidemia  CHF (congestive heart failure)  Hypertension  AICD (automatic cardioverter/defibrillator) present      No Known Allergies      MEDICATIONS  (STANDING):  amiodarone    Tablet 200 milliGRAM(s) Oral daily  ampicillin/sulbactam  IVPB 3 Gram(s) IV Intermittent every 6 hours  apixaban 5 milliGRAM(s) Oral every 12 hours  aspirin  chewable 81 milliGRAM(s) Oral daily  atorvastatin 20 milliGRAM(s) Oral at bedtime  digoxin     Tablet 0.125 milliGRAM(s) Oral every other day  furosemide    Tablet 20 milliGRAM(s) Oral daily  metoprolol succinate  milliGRAM(s) Oral daily  sacubitril 97 mG/valsartan 103 mG 1 Tablet(s) Oral two times a day  spironolactone 25 milliGRAM(s) Oral daily    MEDICATIONS  (PRN):    Creatinine, Serum: 1.54 mg/dL (09.27.18 @ 08:06)  Creatinine, Serum: 1.19 mg/dL (08.17.17 @ 08:38)                          14.1   8.5   )-----------( 190      ( 03 Oct 2018 07:31 )             44.8     10-03    138  |  108  |  16  ----------------------------<  109<H>  4.3   |  24  |  1.37<H>    Ca    8.8      03 Oct 2018 07:31  Phos  4.0     10-02  Mg     2.2     10-02    TPro  7.2  /  Alb  3.7  /  TBili  0.7  /  DBili  x   /  AST  15  /  ALT  28  /  AlkPhos  48  10-03            REVIEW OF SYSTEMS:  General: no fever no chills, no weight loss.  EYES/ENT: No visual changes;  No vertigo, no headache.  NECK: No pain or stiffness  RESPIRATORY: No cough, wheezing, hemoptysis; No shortness of breath  CARDIOVASCULAR: No chest pain or palpitations. No Edema  GASTROINTESTINAL: No abdominal or epigastric pain. No nausea, vomiting. No diarrhea or constipation. No melena.  GENITOURINARY: No dysuria, frequency, foamy urine, urinary urgency, incontinence or hematuria  NEUROLOGICAL: No numbness or weakness, no tremor , no dizziness.   Muscle skeletal : no joint pain and no swelling of joints and limbs.  SKIN: No itching, burning, rashes.        VITALS:  T(F): 97.3 (10-03-18 @ 05:04), Max: 98.6 (10-02-18 @ 18:35)  HR: 73 (10-03-18 @ 05:04)  BP: 146/91 (10-03-18 @ 05:04)  RR: 14 (10-03-18 @ 05:04)  SpO2: 100% (10-03-18 @ 05:04)  Wt(kg): --      PHYSICAL EXAM:  Constitutional: well developed, no diaphoresis, no distress.  Neck: No JVD, no carotid bruit, supple, no adenopathy  Respiratory: Good air entrance B/L, no wheezes, rales or rhonchi  Cardiovascular: S1, S2, RRR, no pericardial rub, no murmur  Abdomen: BS+, soft, no tenderness, no bruit  Pelvis: bladder nondistended  Extremities: No cyanosis or clubbing. No peripheral edema.   Pulses: All present  Neurological: A/O x 3, no focal deficits  Psychiatric: Normal mood, normal affect

## 2018-10-03 NOTE — PROGRESS NOTE ADULT - SUBJECTIVE AND OBJECTIVE BOX
NP Note discussed with  Primary Attending    Patient is a 57y old  Male who presents with a chief complaint of positive blood culture taken during ED visit on Thursday (03 Oct 2018 13:32)    INTERVAL HPI/OVERNIGHT EVENTS: Doing well.  Denied having pain LLL or chills.   Desires to go home tomorrow.  Dr. Mensah, ID consulting.  Tolerating Unasyn well.  No further episodes of elevated temperature. US renal with evidence of enlarged prostate.  Patient denied urinary symptoms and can't recall ever having a rectal examination or PSA.  Patient is scheduled for consultation for ablation 2/2 AFIB on 10/16/18 as outpatient.     MEDICATIONS  (STANDING):  amiodarone    Tablet 200 milliGRAM(s) Oral daily  ampicillin/sulbactam  IVPB 3 Gram(s) IV Intermittent every 6 hours  apixaban 5 milliGRAM(s) Oral every 12 hours  aspirin  chewable 81 milliGRAM(s) Oral daily  atorvastatin 20 milliGRAM(s) Oral at bedtime  digoxin     Tablet 0.125 milliGRAM(s) Oral every other day  furosemide    Tablet 20 milliGRAM(s) Oral daily  metoprolol succinate  milliGRAM(s) Oral daily  sacubitril 97 mG/valsartan 103 mG 1 Tablet(s) Oral two times a day  spironolactone 25 milliGRAM(s) Oral daily    MEDICATIONS  (PRN):  _________________________________________________  REVIEW OF SYSTEMS:  CONSTITUTIONAL: No fever,   EYES: no acute visual disturbances  NECK: No pain or stiffness  RESPIRATORY: No cough; No shortness of breath  CARDIOVASCULAR: No chest pain, no palpitations  GASTROINTESTINAL: No pain. No nausea or vomiting; No diarrhea   NEUROLOGICAL: No headache or numbness, no tremors  MUSCULOSKELETAL: No joint pain, no muscle pain  GENITOURINARY: no dysuria, no frequency, no hesitancy  PSYCHIATRY: no depression , no anxiety  ALL OTHER  ROS negative        Vital Signs Last 24 Hrs  T(C): 36.6 (03 Oct 2018 14:22), Max: 37 (02 Oct 2018 18:35)  T(F): 97.9 (03 Oct 2018 14:22), Max: 98.6 (02 Oct 2018 18:35)  HR: 72 (03 Oct 2018 14:22) (70 - 100)  BP: 129/80 (03 Oct 2018 14:22) (119/51 - 146/91)  BP(mean): --  RR: 16 (03 Oct 2018 14:22) (14 - 17)  SpO2: 100% (03 Oct 2018 14:22) (98% - 100%)    ________________________________________________  PHYSICAL EXAM:  GENERAL: NAD  HEENT: Normocephalic;  conjunctivae and sclerae clear; moist mucous membranes;   NECK : supple  CHEST/LUNG: Clear to auscultation bilaterally with good air entry   HEART: S1 S2  regular; no murmurs, gallops or rubs  ABDOMEN: Soft, Nontender, Nondistended; Bowel sounds present  EXTREMITIES: no cyanosis; no edema; no calf tenderness  SKIN: warm and dry; no rash  NERVOUS SYSTEM:  Awake and alert; Oriented  to place, person and time ; no new deficits    _________________________________________________  LABS:                        14.1   8.5   )-----------( 190      ( 03 Oct 2018 07:31 )             44.8     10-03    138  |  108  |  16  ----------------------------<  109<H>  4.3   |  24  |  1.37<H>    Ca    8.8      03 Oct 2018 07:31  Phos  4.0     10-02  Mg     2.2     10-02    TPro  7.2  /  Alb  3.7  /  TBili  0.7  /  DBili  x   /  AST  15  /  ALT  28  /  AlkPhos  48  10-03        CAPILLARY BLOOD GLUCOSE            RADIOLOGY & ADDITIONAL TESTS:    Imaging Personally Reviewed:  YES/NO    Consultant(s) Notes Reviewed:   YES/ No    Care Discussed with Consultants :     Plan of care was discussed with patient and /or primary care giver; all questions and concerns were addressed and care was aligned with patient's wishes.

## 2018-10-03 NOTE — PROGRESS NOTE ADULT - SUBJECTIVE AND OBJECTIVE BOX
CHIEF COMPLAINT:Patient is a 57y old  Male who presents with a chief complaint of positive blood culture taken during ED visit on Thursday.Pt appears comfortable.    	  REVIEW OF SYSTEMS:  CONSTITUTIONAL: No fever, weight loss, or fatigue  EYES: No eye pain, visual disturbances, or discharge  ENT:  No difficulty hearing, tinnitus, vertigo; No sinus or throat pain  NECK: No pain or stiffness  RESPIRATORY: No cough, wheezing, chills or hemoptysis; No Shortness of Breath  CARDIOVASCULAR: No chest pain, palpitations, passing out, dizziness, or leg swelling  GASTROINTESTINAL: No abdominal or epigastric pain. No nausea, vomiting, or hematemesis; No diarrhea or constipation. No melena or hematochezia.  GENITOURINARY: No dysuria, frequency, hematuria, or incontinence  NEUROLOGICAL: No headaches, memory loss, loss of strength, numbness, or tremors  SKIN: No itching, burning, rashes, or lesions   LYMPH Nodes: No enlarged glands  ENDOCRINE: No heat or cold intolerance; No hair loss  MUSCULOSKELETAL: No joint pain or swelling; No muscle, back, or extremity pain  PSYCHIATRIC: No depression, anxiety, mood swings, or difficulty sleeping  HEME/LYMPH: No easy bruising, or bleeding gums  ALLERGY AND IMMUNOLOGIC: No hives or eczema	      PHYSICAL EXAM:  T(C): 36.3 (10-03-18 @ 05:04), Max: 37 (10-02-18 @ 18:35)  HR: 73 (10-03-18 @ 05:04) (70 - 100)  BP: 146/91 (10-03-18 @ 05:04) (119/51 - 146/91)  RR: 14 (10-03-18 @ 05:04) (14 - 17)  SpO2: 100% (10-03-18 @ 05:04) (98% - 100%)    Appearance: Normal	  HEENT:   Normal oral mucosa, PERRL, EOMI	  Lymphatic: No lymphadenopathy  Cardiovascular: Normal S1 S2, No JVD, No murmurs, No edema  Respiratory: Lungs clear to auscultation	  Psychiatry: A & O x 3, Mood & affect appropriate  Gastrointestinal:  Soft, Non-tender, + BS	  Skin: No rashes, No ecchymoses, No cyanosis	  Neurologic: Non-focal  Extremities: Normal range of motion, No clubbing, cyanosis or edema  Vascular: Peripheral pulses palpable 2+ bilaterally    MEDICATIONS  (STANDING):  amiodarone    Tablet 200 milliGRAM(s) Oral daily  ampicillin/sulbactam  IVPB 3 Gram(s) IV Intermittent every 6 hours  apixaban 5 milliGRAM(s) Oral every 12 hours  aspirin  chewable 81 milliGRAM(s) Oral daily  atorvastatin 20 milliGRAM(s) Oral at bedtime  digoxin     Tablet 0.125 milliGRAM(s) Oral every other day  furosemide    Tablet 20 milliGRAM(s) Oral daily  metoprolol succinate  milliGRAM(s) Oral daily  sacubitril 97 mG/valsartan 103 mG 1 Tablet(s) Oral two times a day  spironolactone 25 milliGRAM(s) Oral daily        	  LABS:	 	                       14.1   8.5   )-----------( 190      ( 03 Oct 2018 07:31 )             44.8     10-03    138  |  108  |  16  ----------------------------<  109<H>  4.3   |  24  |  1.37<H>    Ca    8.8      03 Oct 2018 07:31  Phos  4.0     10-02  Mg     2.2     10-02    TPro  7.2  /  Alb  3.7  /  TBili  0.7  /  DBili  x   /  AST  15  /  ALT  28  /  AlkPhos  48  10-03    blood cx-.  	    EXAM:  US URINARY BLADDER                          EXAM:  US KIDNEY(S)                            PROCEDURE DATE:  10/02/2018          INTERPRETATION:  CLINICAL INFORMATION: Bacteremia.    COMPARISON: None available.    TECHNIQUE: Sonography of thekidneys and bladder.     FINDINGS:    Right kidney:  11.3 cm. No renal mass, hydronephrosis or calculi.   Sonolucent lower pole cyst measures 4.4 x 4.3 x 4.4 cm.    Left kidney:  11.7 cm. No renal mass, hydronephrosis or calculi.   Sonolucent midpolecyst measures 1.9 x 1.8 x 1.7 cm.    Urinary bladder: Within normal limits.    Prevoid volume 98 cc. No post void residual volume.    Prostate gland measures 5.4 x 4.1 x 4.5 cm    IMPRESSION:     No obstructive uropathy. Enlarged prostate gland.    Bilateral renal cysts

## 2018-10-03 NOTE — PROGRESS NOTE ADULT - PROBLEM SELECTOR PLAN 3
Amiodarone    Tablet 200 milliGRAM(s) Oral daily  Apixaban 5 milliGRAM(s) Oral every 12 hours  Atorvastatin 20 milliGRAM(s) Oral at bedtime  Digoxin Tablet 0.125 milliGRAM(s) Oral every other day  Metoprolol succinate  milliGRAM(s) Oral daily

## 2018-10-04 RX ORDER — SIMETHICONE 80 MG/1
80 TABLET, CHEWABLE ORAL ONCE
Qty: 0 | Refills: 0 | Status: COMPLETED | OUTPATIENT
Start: 2018-10-04 | End: 2018-10-04

## 2018-10-04 RX ADMIN — SPIRONOLACTONE 25 MILLIGRAM(S): 25 TABLET, FILM COATED ORAL at 05:34

## 2018-10-04 RX ADMIN — APIXABAN 5 MILLIGRAM(S): 2.5 TABLET, FILM COATED ORAL at 17:30

## 2018-10-04 RX ADMIN — Medication 0.12 MILLIGRAM(S): at 11:58

## 2018-10-04 RX ADMIN — Medication 20 MILLIGRAM(S): at 05:34

## 2018-10-04 RX ADMIN — SACUBITRIL AND VALSARTAN 1 TABLET(S): 24; 26 TABLET, FILM COATED ORAL at 17:30

## 2018-10-04 RX ADMIN — SIMETHICONE 80 MILLIGRAM(S): 80 TABLET, CHEWABLE ORAL at 08:54

## 2018-10-04 RX ADMIN — ATORVASTATIN CALCIUM 20 MILLIGRAM(S): 80 TABLET, FILM COATED ORAL at 21:26

## 2018-10-04 RX ADMIN — AMIODARONE HYDROCHLORIDE 200 MILLIGRAM(S): 400 TABLET ORAL at 05:36

## 2018-10-04 RX ADMIN — APIXABAN 5 MILLIGRAM(S): 2.5 TABLET, FILM COATED ORAL at 05:34

## 2018-10-04 RX ADMIN — AMPICILLIN SODIUM AND SULBACTAM SODIUM 200 GRAM(S): 250; 125 INJECTION, POWDER, FOR SUSPENSION INTRAMUSCULAR; INTRAVENOUS at 05:33

## 2018-10-04 RX ADMIN — Medication 81 MILLIGRAM(S): at 11:58

## 2018-10-04 RX ADMIN — AMPICILLIN SODIUM AND SULBACTAM SODIUM 200 GRAM(S): 250; 125 INJECTION, POWDER, FOR SUSPENSION INTRAMUSCULAR; INTRAVENOUS at 11:58

## 2018-10-04 RX ADMIN — SACUBITRIL AND VALSARTAN 1 TABLET(S): 24; 26 TABLET, FILM COATED ORAL at 05:34

## 2018-10-04 RX ADMIN — Medication 100 MILLIGRAM(S): at 05:34

## 2018-10-04 RX ADMIN — AMPICILLIN SODIUM AND SULBACTAM SODIUM 200 GRAM(S): 250; 125 INJECTION, POWDER, FOR SUSPENSION INTRAMUSCULAR; INTRAVENOUS at 23:58

## 2018-10-04 RX ADMIN — AMPICILLIN SODIUM AND SULBACTAM SODIUM 200 GRAM(S): 250; 125 INJECTION, POWDER, FOR SUSPENSION INTRAMUSCULAR; INTRAVENOUS at 17:30

## 2018-10-04 NOTE — PROGRESS NOTE ADULT - PROBLEM SELECTOR PLAN 1
Blood cultures positive for Strep B.  Repeat cultures No growth to date.  Continue antibiotics as per ID-Ampicillin/sulbactam  IVPB 3 Gram(s) IV Intermittent every 6 hours  ID F/U for home antibiotics. Possible discharge tomorrow.
Group Strep B  Repeat Bcx NGTD  Ampicillin/sulbactam  IVPB 3 Gram(s) IV Intermittent every 6 hours  Discharge plan per Dr. Mensah, ID (Abx)
BC from 9/27 ED visit positive for strep B agalactiae  Repeat BC X 2 testingUnasyn, IV day two  Dr. Mensah, ID, called to consult  RVP negative/UC negative  WBC 7.7/afebrile

## 2018-10-04 NOTE — PROGRESS NOTE ADULT - PROBLEM SELECTOR PLAN 2
Likely secondary to sepsis.  Improving.  Daily BMP
Resolving  Likely secondary to sepsis per Dr. Lala, consulting nephrologist  ELVIA in AM
No in exacerbation  Lasix 20 mg., daily   Entresto/Toprol XL/Spriolactone continued  Dr. Goodman, cardio, following  Echo ordered/pending

## 2018-10-04 NOTE — PROGRESS NOTE ADULT - SUBJECTIVE AND OBJECTIVE BOX
CHIEF COMPLAINT:Patient is a 57y old  Male who presents with a chief complaint of positive blood culture taken during ED visit on Thursday .Pt appears comfortable.    	  REVIEW OF SYSTEMS:  CONSTITUTIONAL: No fever, weight loss, or fatigue  EYES: No eye pain, visual disturbances, or discharge  ENT:  No difficulty hearing, tinnitus, vertigo; No sinus or throat pain  NECK: No pain or stiffness  RESPIRATORY: No cough, wheezing, chills or hemoptysis; No Shortness of Breath  CARDIOVASCULAR: No chest pain, palpitations, passing out, dizziness, or leg swelling  GASTROINTESTINAL: No abdominal or epigastric pain. No nausea, vomiting, or hematemesis; No diarrhea or constipation. No melena or hematochezia.  GENITOURINARY: No dysuria, frequency, hematuria, or incontinence  NEUROLOGICAL: No headaches, memory loss, loss of strength, numbness, or tremors  SKIN: No itching, burning, rashes, or lesions   LYMPH Nodes: No enlarged glands  ENDOCRINE: No heat or cold intolerance; No hair loss  MUSCULOSKELETAL: No joint pain or swelling; No muscle, back, or extremity pain  PSYCHIATRIC: No depression, anxiety, mood swings, or difficulty sleeping  HEME/LYMPH: No easy bruising, or bleeding gums  ALLERGY AND IMMUNOLOGIC: No hives or eczema	    PHYSICAL EXAM:  T(C): 36.3 (10-04-18 @ 05:18), Max: 36.6 (10-03-18 @ 14:22)  HR: 70 (10-04-18 @ 05:18) (70 - 72)  BP: 119/75 (10-04-18 @ 05:18) (118/70 - 129/80)  RR: 16 (10-04-18 @ 05:18) (14 - 16)  SpO2: 100% (10-04-18 @ 05:18) (98% - 100%)      Appearance: Normal	  HEENT:   Normal oral mucosa, PERRL, EOMI	  Lymphatic: No lymphadenopathy  Cardiovascular: Normal S1 S2, No JVD, No murmurs, No edema  Respiratory: Lungs clear to auscultation	  Psychiatry: A & O x 3, Mood & affect appropriate  Gastrointestinal:  Soft, Non-tender, + BS	  Skin: No rashes, No ecchymoses, No cyanosis	  Neurologic: Non-focal  Extremities: Normal range of motion, No clubbing, cyanosis or edema  Vascular: Peripheral pulses palpable 2+ bilaterally    MEDICATIONS  (STANDING):  amiodarone    Tablet 200 milliGRAM(s) Oral daily  ampicillin/sulbactam  IVPB 3 Gram(s) IV Intermittent every 6 hours  apixaban 5 milliGRAM(s) Oral every 12 hours  aspirin  chewable 81 milliGRAM(s) Oral daily  atorvastatin 20 milliGRAM(s) Oral at bedtime  digoxin     Tablet 0.125 milliGRAM(s) Oral every other day  furosemide    Tablet 20 milliGRAM(s) Oral daily  metoprolol succinate  milliGRAM(s) Oral daily  sacubitril 97 mG/valsartan 103 mG 1 Tablet(s) Oral two times a day  spironolactone 25 milliGRAM(s) Oral daily      LABS:	 	                       14.1   8.5   )-----------( 190      ( 03 Oct 2018 07:31 )             44.8     10-03    138  |  108  |  16  ----------------------------<  109<H>  4.3   |  24  |  1.37<H>    Ca    8.8      03 Oct 2018 07:31    TPro  7.2  /  Alb  3.7  /  TBili  0.7  /  DBili  x   /  AST  15  /  ALT  28  /  AlkPhos  48  10-03    blood cx-.

## 2018-10-04 NOTE — PROGRESS NOTE ADULT - SUBJECTIVE AND OBJECTIVE BOX
NP Note discussed with  Primary Attending    Patient is a 57y old  Male who presents with a chief complaint of positive blood culture taken during ED visit on Thursday (03 Oct 2018 14:46)      INTERVAL HPI/OVERNIGHT EVENTS: no new complaints    MEDICATIONS  (STANDING):  amiodarone    Tablet 200 milliGRAM(s) Oral daily  ampicillin/sulbactam  IVPB 3 Gram(s) IV Intermittent every 6 hours  apixaban 5 milliGRAM(s) Oral every 12 hours  aspirin  chewable 81 milliGRAM(s) Oral daily  atorvastatin 20 milliGRAM(s) Oral at bedtime  digoxin     Tablet 0.125 milliGRAM(s) Oral every other day  furosemide    Tablet 20 milliGRAM(s) Oral daily  metoprolol succinate  milliGRAM(s) Oral daily  sacubitril 97 mG/valsartan 103 mG 1 Tablet(s) Oral two times a day  spironolactone 25 milliGRAM(s) Oral daily    MEDICATIONS  (PRN):      __________________________________________________  REVIEW OF SYSTEMS:    CONSTITUTIONAL: No fever,   EYES: no acute visual disturbances  NECK: No pain or stiffness  RESPIRATORY: No cough; No shortness of breath  CARDIOVASCULAR: No chest pain, no palpitations  GASTROINTESTINAL: No pain. No nausea or vomiting; No diarrhea   NEUROLOGICAL: No headache or numbness, no tremors  MUSCULOSKELETAL: No joint pain, no muscle pain  GENITOURINARY: no dysuria, no frequency, no hesitancy  PSYCHIATRY: no depression , no anxiety  ALL OTHER  ROS negative        Vital Signs Last 24 Hrs  T(C): 36.3 (04 Oct 2018 05:18), Max: 36.6 (03 Oct 2018 14:22)  T(F): 97.4 (04 Oct 2018 05:18), Max: 97.9 (03 Oct 2018 14:22)  HR: 70 (04 Oct 2018 05:18) (70 - 72)  BP: 119/75 (04 Oct 2018 05:18) (118/70 - 129/80)  BP(mean): --  RR: 16 (04 Oct 2018 05:18) (14 - 16)  SpO2: 100% (04 Oct 2018 05:18) (98% - 100%)    ________________________________________________  PHYSICAL EXAM:  GENERAL: NAD  HEENT: Normocephalic;  conjunctivae and sclerae clear; moist mucous membranes;   NECK : supple  CHEST/LUNG: Clear to auscultation bilaterally with good air entry   HEART: S1 S2  regular; no murmurs, gallops or rubs  ABDOMEN: Soft, Nontender, Nondistended; Bowel sounds present  EXTREMITIES: no cyanosis; no edema; no calf tenderness  SKIN: warm and dry; no rash  NERVOUS SYSTEM:  Awake and alert; Oriented  to place, person and time ; no new deficits    _________________________________________________  LABS:                        14.1   8.5   )-----------( 190      ( 03 Oct 2018 07:31 )             44.8     10-03    138  |  108  |  16  ----------------------------<  109<H>  4.3   |  24  |  1.37<H>    Ca    8.8      03 Oct 2018 07:31    TPro  7.2  /  Alb  3.7  /  TBili  0.7  /  DBili  x   /  AST  15  /  ALT  28  /  AlkPhos  48  10-03        CAPILLARY BLOOD GLUCOSE            RADIOLOGY & ADDITIONAL TESTS:    Imaging Personally Reviewed:  YES  < from: Xray Chest 2 Views PA/Lat (09.27.18 @ 09:44) >  Left cardiac pacer in position. No change borderline to mild   cardiomegaly. No consolidation or effusion.    < end of copied text >    Consultant(s) Notes Reviewed:   YES    Care Discussed with Consultants :     Plan of care was discussed with patient and /or primary care giver; all questions and concerns were addressed and care was aligned with patient's wishes.

## 2018-10-04 NOTE — PROGRESS NOTE ADULT - SUBJECTIVE AND OBJECTIVE BOX
57y Male is under our care for group b strep bacteremia and mild left leg cellulitis. Patient continues to feel better, but admits that he had gassiness discomfort in am, resolved with alkaseltzer tabs. Repeat BC from 10/01 remains negative. BC from 10/03 is still pending, if neg will be able to be dc'ed tomorrow on PO antibiotics.    REVIEW OF SYSTEMS:  [  ] Not able to illicit  General: no fevers no malaise  Chest: no cough no sob  GI: no nvd  : no urinary sxs   Skin: no rashes  Musculoskeletal: no trauma no LBP  Neuro: no ha's no dizziness     MEDS:  ampicillin/sulbactam  IVPB 3 Gram(s) IV Intermittent every 6 hours    ALLERGIES: No Known Allergies    VITALS:  Vital Signs Last 24 Hrs  T(C): 36.5 (04 Oct 2018 15:04), Max: 36.5 (04 Oct 2018 15:04)  T(F): 97.7 (04 Oct 2018 15:04), Max: 97.7 (04 Oct 2018 15:04)  HR: 72 (04 Oct 2018 15:04) (70 - 72)  BP: 153/91 (04 Oct 2018 15:04) (118/70 - 153/91)  BP(mean): --  RR: 16 (04 Oct 2018 15:04) (14 - 16)  SpO2: 100% (04 Oct 2018 15:04) (98% - 100%)    PHYSICAL EXAM:  HEENT: n/a  Neck: supple no LN's   Respiratory: lungs clear no rales  Cardiovascular: S1 S2 reg no murmurs  Gastrointestinal: +BS with soft, nondistended abdomen; nontender  Extremities: trace pitting edema of left lower leg   Skin: faint lingering warmth of left shin, nontender  Ortho: n/a  Neuro: AAO x 3      LABS/DIAGNOSTIC TESTS: No new labs        CULTURES:   10/03 BC - pending     .Blood Blood-Peripheral  10-01 @ 20:10   No growth to date.  --  --      .Urine Clean Catch (Midstream)  09-27 @ 14:21   No growth  --  --      .Blood Blood-Peripheral  09-27 @ 13:42   Growth in aerobic bottle: Streptococcus agalactiae (Group B)  See previous culture [id number]  --  Blood Culture PCR  Streptococcus agalactiae (Group B)        RADIOLOGY:    No new studies

## 2018-10-04 NOTE — PROGRESS NOTE ADULT - PROBLEM SELECTOR PLAN 3
Rate controlled.  Continue Amiodarone 200mg daily.  Digoxin 0.125 every other day. Rate controlled.  Continue Amiodarone 200mg daily.  Digoxin 0.125 every other day.  Metoprolol ER 100mg daily       Apixaban 5mg every 12 hours

## 2018-10-04 NOTE — PROGRESS NOTE ADULT - PROBLEM SELECTOR PLAN 5
Atorvastatin 20 milliGRAM(s) Oral at bedtime
cr 1.43  Renal/Bladder US showed No obstructive uropathy. Enlarged prostate gland.  Bilateral renal cysts
Continue current meds as above.  Continue DASH/TLC diet.

## 2018-10-04 NOTE — PROGRESS NOTE ADULT - PROBLEM SELECTOR PLAN 4
Amiodarone    Tablet 200 milliGRAM(s) Oral daily  Sacubitril 97 mG/valsartan 103 mG 1 Tablet(s) Oral two times a day  Spironolactone 25 milliGRAM(s) Oral daily  Aspirin  chewable 81 milliGRAM(s) Oral daily  Furosemide    Tablet 20 milliGRAM(s) Oral daily
Pt continued with toprol XL, Lasix, Spirolactone  DASH diet  BP monitored/stable
Stable  Continue Amiodarane 200mg daily.  Sacubitril 97 mG/valsartan 103 mG 1 Tablet(s) Oral two times a day  Spironolactone 25 milliGRAM(s) Oral daily  Aspirin  chewable 81 milliGRAM(s) Oral daily  Furosemide    Tablet 20 milliGRAM(s) Oral daily.

## 2018-10-04 NOTE — PROGRESS NOTE ADULT - PROBLEM SELECTOR PLAN 6
Sacubitril 97 mG/valsartan 103 mG 1 Tablet(s) Oral two times a day  Spironolactone 25 milliGRAM(s) Oral daily  Furosemide    Tablet 20 milliGRAM(s) Oral daily
Eliquis continued
Continue Atorvastatin

## 2018-10-05 ENCOUNTER — TRANSCRIPTION ENCOUNTER (OUTPATIENT)
Age: 58
End: 2018-10-05

## 2018-10-05 VITALS
SYSTOLIC BLOOD PRESSURE: 137 MMHG | DIASTOLIC BLOOD PRESSURE: 77 MMHG | TEMPERATURE: 99 F | OXYGEN SATURATION: 100 % | RESPIRATION RATE: 16 BRPM | HEART RATE: 73 BPM

## 2018-10-05 LAB
ALBUMIN SERPL ELPH-MCNC: 3.6 G/DL — SIGNIFICANT CHANGE UP (ref 3.5–5)
ALP SERPL-CCNC: 49 U/L — SIGNIFICANT CHANGE UP (ref 40–120)
ALT FLD-CCNC: 24 U/L DA — SIGNIFICANT CHANGE UP (ref 10–60)
ANION GAP SERPL CALC-SCNC: 8 MMOL/L — SIGNIFICANT CHANGE UP (ref 5–17)
AST SERPL-CCNC: 14 U/L — SIGNIFICANT CHANGE UP (ref 10–40)
BILIRUB SERPL-MCNC: 1 MG/DL — SIGNIFICANT CHANGE UP (ref 0.2–1.2)
BUN SERPL-MCNC: 13 MG/DL — SIGNIFICANT CHANGE UP (ref 7–18)
CALCIUM SERPL-MCNC: 9.8 MG/DL — SIGNIFICANT CHANGE UP (ref 8.4–10.5)
CHLORIDE SERPL-SCNC: 107 MMOL/L — SIGNIFICANT CHANGE UP (ref 96–108)
CO2 SERPL-SCNC: 25 MMOL/L — SIGNIFICANT CHANGE UP (ref 22–31)
CREAT SERPL-MCNC: 1.59 MG/DL — HIGH (ref 0.5–1.3)
GLUCOSE SERPL-MCNC: 100 MG/DL — HIGH (ref 70–99)
HCT VFR BLD CALC: 43.8 % — SIGNIFICANT CHANGE UP (ref 39–50)
HGB BLD-MCNC: 14 G/DL — SIGNIFICANT CHANGE UP (ref 13–17)
INR BLD: 1.35 RATIO — HIGH (ref 0.88–1.16)
MCHC RBC-ENTMCNC: 31.1 PG — SIGNIFICANT CHANGE UP (ref 27–34)
MCHC RBC-ENTMCNC: 31.9 GM/DL — LOW (ref 32–36)
MCV RBC AUTO: 97.5 FL — SIGNIFICANT CHANGE UP (ref 80–100)
PLATELET # BLD AUTO: 194 K/UL — SIGNIFICANT CHANGE UP (ref 150–400)
POTASSIUM SERPL-MCNC: 4.2 MMOL/L — SIGNIFICANT CHANGE UP (ref 3.5–5.3)
POTASSIUM SERPL-SCNC: 4.2 MMOL/L — SIGNIFICANT CHANGE UP (ref 3.5–5.3)
PROT SERPL-MCNC: 7.3 G/DL — SIGNIFICANT CHANGE UP (ref 6–8.3)
PROTHROM AB SERPL-ACNC: 14.8 SEC — HIGH (ref 9.8–12.7)
RBC # BLD: 4.49 M/UL — SIGNIFICANT CHANGE UP (ref 4.2–5.8)
RBC # FLD: 13.4 % — SIGNIFICANT CHANGE UP (ref 10.3–14.5)
SODIUM SERPL-SCNC: 140 MMOL/L — SIGNIFICANT CHANGE UP (ref 135–145)
WBC # BLD: 9.9 K/UL — SIGNIFICANT CHANGE UP (ref 3.8–10.5)
WBC # FLD AUTO: 9.9 K/UL — SIGNIFICANT CHANGE UP (ref 3.8–10.5)

## 2018-10-05 PROCEDURE — 80053 COMPREHEN METABOLIC PANEL: CPT

## 2018-10-05 PROCEDURE — 83735 ASSAY OF MAGNESIUM: CPT

## 2018-10-05 PROCEDURE — 87040 BLOOD CULTURE FOR BACTERIA: CPT

## 2018-10-05 PROCEDURE — 93306 TTE W/DOPPLER COMPLETE: CPT

## 2018-10-05 PROCEDURE — 93005 ELECTROCARDIOGRAM TRACING: CPT

## 2018-10-05 PROCEDURE — 84100 ASSAY OF PHOSPHORUS: CPT

## 2018-10-05 PROCEDURE — 83605 ASSAY OF LACTIC ACID: CPT

## 2018-10-05 PROCEDURE — 85610 PROTHROMBIN TIME: CPT

## 2018-10-05 PROCEDURE — 76775 US EXAM ABDO BACK WALL LIM: CPT

## 2018-10-05 PROCEDURE — 99285 EMERGENCY DEPT VISIT HI MDM: CPT | Mod: 25

## 2018-10-05 PROCEDURE — 76857 US EXAM PELVIC LIMITED: CPT

## 2018-10-05 PROCEDURE — 85027 COMPLETE CBC AUTOMATED: CPT

## 2018-10-05 RX ORDER — CEFUROXIME AXETIL 250 MG
500 TABLET ORAL
Qty: 10000 | Refills: 0
Start: 2018-10-05 | End: 2018-10-14

## 2018-10-05 RX ORDER — CEFUROXIME AXETIL 250 MG
500 TABLET ORAL
Qty: 10000 | Refills: 0 | OUTPATIENT
Start: 2018-10-05 | End: 2018-10-14

## 2018-10-05 RX ADMIN — SACUBITRIL AND VALSARTAN 1 TABLET(S): 24; 26 TABLET, FILM COATED ORAL at 05:56

## 2018-10-05 RX ADMIN — AMPICILLIN SODIUM AND SULBACTAM SODIUM 200 GRAM(S): 250; 125 INJECTION, POWDER, FOR SUSPENSION INTRAMUSCULAR; INTRAVENOUS at 12:08

## 2018-10-05 RX ADMIN — AMIODARONE HYDROCHLORIDE 200 MILLIGRAM(S): 400 TABLET ORAL at 05:56

## 2018-10-05 RX ADMIN — SPIRONOLACTONE 25 MILLIGRAM(S): 25 TABLET, FILM COATED ORAL at 05:56

## 2018-10-05 RX ADMIN — Medication 100 MILLIGRAM(S): at 05:56

## 2018-10-05 RX ADMIN — APIXABAN 5 MILLIGRAM(S): 2.5 TABLET, FILM COATED ORAL at 05:56

## 2018-10-05 RX ADMIN — AMPICILLIN SODIUM AND SULBACTAM SODIUM 200 GRAM(S): 250; 125 INJECTION, POWDER, FOR SUSPENSION INTRAMUSCULAR; INTRAVENOUS at 05:56

## 2018-10-05 RX ADMIN — Medication 20 MILLIGRAM(S): at 05:56

## 2018-10-05 RX ADMIN — Medication 81 MILLIGRAM(S): at 12:08

## 2018-10-05 NOTE — DISCHARGE NOTE ADULT - CONDITIONS AT DISCHARGE
in stable condition follow up and instructions for D/C meds explained and given pt verbalized understanding left saline lock removed no S/S of infection noted  VS as follow Temp 98.8 Hr 73 /77 resp 16 100% R/A

## 2018-10-05 NOTE — DISCHARGE NOTE ADULT - CARE PLAN
Principal Discharge DX:	Bacteremia  Goal:	To be free of infection  Assessment and plan of treatment:	Continue antibiotics until completed.  Secondary Diagnosis:	CHF (congestive heart failure)  Goal:	To maintain current cardiac function.  Assessment and plan of treatment:	Continue all cardiac medications.  Follow up with Dr Goodman within 7 days of discharge.  Secondary Diagnosis:	Hyperlipidemia  Goal:	Maintain normal lipid level  Assessment and plan of treatment:	Continue atorvastatin daily at bedtime.  DASH/TLC Sodium and cholesterol restricted diet.  Secondary Diagnosis:	Hypertension  Goal:	Maintain normal blood pressure  Assessment and plan of treatment:	Continue Toprol XL  DASH/TLC Sodium and cholesterol restricted diet.  Secondary Diagnosis:	Afib  Goal:	Maintain normal cardiac rhythm  Assessment and plan of treatment:	Continue digoxin and Toprol.  Continue Aspirin and Apixaban.  Secondary Diagnosis:	KIARA (acute kidney injury)  Goal:	Maintain and preserve current kidney function.  Assessment and plan of treatment:	Follow up with Nephrology- Dr Jacobson as an outpatient.  Maintain control of blood pressure.  Secondary Diagnosis:	AICD (automatic cardioverter/defibrillator) present  Goal:	Maintain cardiac status  Assessment and plan of treatment:	Follow up with Dr Goodman within 1 week from discharge.  Return to ED for any firing of AICD

## 2018-10-05 NOTE — PROGRESS NOTE ADULT - SUBJECTIVE AND OBJECTIVE BOX
57y Male is under our care for     REVIEW OF SYSTEMS:  [  ] Not able to illicit  General:	  Chest:	  GI:	  :  Skin:	  Musculoskeletal:	  Neuro:	    MEDS:  ampicillin/sulbactam  IVPB 3 Gram(s) IV Intermittent every 6 hours    ALLERGIES: Allergies    No Known Allergies    Intolerances        VITALS:  Vital Signs Last 24 Hrs  T(C): 36.5 (05 Oct 2018 05:55), Max: 36.8 (04 Oct 2018 20:49)  T(F): 97.7 (05 Oct 2018 05:55), Max: 98.2 (04 Oct 2018 20:49)  HR: 74 (05 Oct 2018 05:55) (70 - 74)  BP: 132/84 (05 Oct 2018 05:55) (111/74 - 153/91)  BP(mean): --  RR: 14 (05 Oct 2018 05:55) (14 - 16)  SpO2: 100% (05 Oct 2018 05:55) (99% - 100%)      PHYSICAL EXAM:  HEENT:  Neck:  Respiratory:  Cardiovascular:  Gastrointestinal:  Extremities:  Skin:  Ortho:  Neuro:    LABS/DIAGNOSTIC TESTS:                        14.0   9.9   )-----------( 194      ( 05 Oct 2018 07:16 )             43.8     WBC Count: 9.9 K/uL (10-05 @ 07:16)  WBC Count: 8.5 K/uL (10-03 @ 07:31)  WBC Count: 7.7 K/uL (10-02 @ 07:36)  WBC Count: 7.3 K/uL (10-01 @ 16:49)    10-05    140  |  107  |  13  ----------------------------<  100<H>  4.2   |  25  |  1.59<H>    Ca    9.8      05 Oct 2018 07:16    TPro  7.3  /  Alb  3.6  /  TBili  1.0  /  DBili  x   /  AST  14  /  ALT  24  /  AlkPhos  49  10-05      CULTURES:   .Blood Blood-Peripheral  10-03 @ 23:58   No growth to date.  --  --      .Blood Blood-Peripheral  10-01 @ 20:10   No growth to date.  --  --      .Urine Clean Catch (Midstream)  09-27 @ 14:21   No growth  --  --      .Blood Blood-Peripheral  09-27 @ 13:42   Growth in aerobic bottle: Streptococcus agalactiae (Group B)  See previous culture [id number]  --  Blood Culture PCR  Streptococcus agalactiae (Group B)        RADIOLOGY:  no new studies 57y Male is under our care for group b strep bacteremia and mild left leg cellulitis. Patient is doing well and has no complaints at this time. Repeat BC are negative. Due for dc home today on PO antibiotics.     REVIEW OF SYSTEMS:  [  ] Not able to illicit  General: no fevers no malaise  Chest: no cough no sob  GI: no nvd  : no urinary sxs   Skin: no rashes  Musculoskeletal: no trauma no LBP  Neuro: no ha's no dizziness 	    MEDS:  ampicillin/sulbactam  IVPB 3 Gram(s) IV Intermittent every 6 hours    ALLERGIES: No Known Allergies    VITALS:  Vital Signs Last 24 Hrs  T(C): 36.5 (05 Oct 2018 05:55), Max: 36.8 (04 Oct 2018 20:49)  T(F): 97.7 (05 Oct 2018 05:55), Max: 98.2 (04 Oct 2018 20:49)  HR: 74 (05 Oct 2018 05:55) (70 - 74)  BP: 132/84 (05 Oct 2018 05:55) (111/74 - 153/91)  BP(mean): --  RR: 14 (05 Oct 2018 05:55) (14 - 16)  SpO2: 100% (05 Oct 2018 05:55) (99% - 100%)      PHYSICAL EXAM:  HEENT: n/a  Neck: supple no LN's   Respiratory: lungs clear no rales  Cardiovascular: S1 S2 reg no murmurs  Gastrointestinal: +BS with soft, nondistended abdomen; nontender  Extremities: no edema  Skin: resolved left leg cellulitis  Ortho: n/a  Neuro: AAO x 3      LABS/DIAGNOSTIC TESTS:                        14.0   9.9   )-----------( 194      ( 05 Oct 2018 07:16 )             43.8     WBC Count: 9.9 K/uL (10-05 @ 07:16)  WBC Count: 8.5 K/uL (10-03 @ 07:31)  WBC Count: 7.7 K/uL (10-02 @ 07:36)  WBC Count: 7.3 K/uL (10-01 @ 16:49)    10-05    140  |  107  |  13  ----------------------------<  100<H>  4.2   |  25  |  1.59<H> 1.37 < 1.43    Ca    9.8      05 Oct 2018 07:16    TPro  7.3  /  Alb  3.6  /  TBili  1.0  /  DBili  x   /  AST  14  /  ALT  24  /  AlkPhos  49  10-05        CULTURES:   .Blood Blood-Peripheral  10-03 @ 23:58   No growth to date.  --  --      .Blood Blood-Peripheral  10-01 @ 20:10   No growth to date.  --  --      .Urine Clean Catch (Midstream)  09-27 @ 14:21   No growth  --  --      .Blood Blood-Peripheral  09-27 @ 13:42   Growth in aerobic bottle: Streptococcus agalactiae (Group B)  See previous culture [id number]  --  Blood Culture PCR  Streptococcus agalactiae (Group B)        RADIOLOGY:  no new studies

## 2018-10-05 NOTE — DISCHARGE NOTE ADULT - SECONDARY DIAGNOSIS.
CHF (congestive heart failure) Hyperlipidemia Hypertension Afib KIARA (acute kidney injury) AICD (automatic cardioverter/defibrillator) present

## 2018-10-05 NOTE — DISCHARGE NOTE ADULT - PLAN OF CARE
To be free of infection Continue antibiotics until completed. To maintain current cardiac function. Continue all cardiac medications.  Follow up with Dr Goodman within 7 days of discharge. Maintain normal lipid level Continue atorvastatin daily at bedtime.  DASH/TLC Sodium and cholesterol restricted diet. Maintain normal blood pressure Continue Toprol XL  DASH/TLC Sodium and cholesterol restricted diet. Maintain normal cardiac rhythm Continue digoxin and Toprol.  Continue Aspirin and Apixaban. Maintain and preserve current kidney function. Follow up with Nephrology- Dr Jacobson as an outpatient.  Maintain control of blood pressure. Maintain cardiac status Follow up with Dr Goodman within 1 week from discharge.  Return to ED for any firing of AICD

## 2018-10-05 NOTE — DISCHARGE NOTE ADULT - PATIENT PORTAL LINK FT
You can access the [facility] Patient Portal, offered by [facility], by registering with the following website: [email or web address]

## 2018-10-05 NOTE — DISCHARGE NOTE ADULT - PROVIDER TOKENS
TOKEN:'[id number]',FREE:[LAST:[Jacobson],FIRST:[Kobe],PHONE:[phone]],FAX:[(   )    -],ADDRESS:[[address] [place] [address]]]

## 2018-10-05 NOTE — DISCHARGE NOTE ADULT - MEDICATION SUMMARY - MEDICATIONS TO TAKE
I will START or STAY ON the medications listed below when I get home from the hospital:    Ceftin  -- 500 milligram(s) by mouth every 12 hours MDD:2  -- Indication: For Bacteremia    spironolactone 25 mg oral tablet  -- 1 tab(s) by mouth once a day  -- Indication: For CHF (congestive heart failure)    aspirin 81 mg oral tablet, chewable  -- 1 tab(s) by mouth once a day  -- Indication: For Prophylactic measure    Entresto 97 mg-103 mg oral tablet  -- 1 tab(s) by mouth 2 times a day  -- Indication: For CHF (congestive heart failure)    digoxin 125 mcg (0.125 mg) oral tablet  -- 1 tab(s) by mouth once a day  -- Indication: For Afib    amiodarone 200 mg oral tablet  -- 1 tab(s) by mouth once a day  -- Avoid grapefruit and grapefruit juice while taking this medication.  Avoid prolonged or excessive exposure to direct and/or artificial sunlight while taking this medication.  Do not take this drug if you are pregnant.  It is very important that you take or use this exactly as directed.  Do not skip doses or discontinue unless directed by your doctor.  May cause drowsiness or dizziness.    -- Indication: For Afib    apixaban 5 mg oral tablet  -- 1 tab(s) by mouth every 12 hours MDD:10mg  -- Indication: For Afib    atorvastatin 20 mg oral tablet  -- 1 tab(s) by mouth once a day  -- Indication: For Hyperlipidemia    metoprolol succinate 100 mg oral tablet, extended release  -- 1 tab(s) by mouth once a day  -- Indication: For Afib    Lasix 40 mg oral tablet  -- 1 tab(s) by mouth once a day, As Needed  -- Indication: For CHF (congestive heart failure)

## 2018-10-05 NOTE — PROGRESS NOTE ADULT - SUBJECTIVE AND OBJECTIVE BOX
CHIEF COMPLAINT:Patient is a 57y old  Male who presents with a chief complaint of positive blood culture taken during ED visit on Thursday.Pt appears comfortable.    	  REVIEW OF SYSTEMS:  CONSTITUTIONAL: No fever, weight loss, or fatigue  EYES: No eye pain, visual disturbances, or discharge  ENT:  No difficulty hearing, tinnitus, vertigo; No sinus or throat pain  NECK: No pain or stiffness  RESPIRATORY: No cough, wheezing, chills or hemoptysis; No Shortness of Breath  CARDIOVASCULAR: No chest pain, palpitations, passing out, dizziness, or leg swelling  GASTROINTESTINAL: No abdominal or epigastric pain. No nausea, vomiting, or hematemesis; No diarrhea or constipation. No melena or hematochezia.  GENITOURINARY: No dysuria, frequency, hematuria, or incontinence  NEUROLOGICAL: No headaches, memory loss, loss of strength, numbness, or tremors  SKIN: No itching, burning, rashes, or lesions   LYMPH Nodes: No enlarged glands  ENDOCRINE: No heat or cold intolerance; No hair loss  MUSCULOSKELETAL: No joint pain or swelling; No muscle, back, or extremity pain  PSYCHIATRIC: No depression, anxiety, mood swings, or difficulty sleeping  HEME/LYMPH: No easy bruising, or bleeding gums  ALLERGY AND IMMUNOLOGIC: No hives or eczema	      PHYSICAL EXAM:  T(C): 36.5 (10-05-18 @ 05:55), Max: 36.8 (10-04-18 @ 20:49)  HR: 74 (10-05-18 @ 05:55) (70 - 74)  BP: 132/84 (10-05-18 @ 05:55) (111/74 - 153/91)  RR: 14 (10-05-18 @ 05:55) (14 - 16)  SpO2: 100% (10-05-18 @ 05:55) (99% - 100%)      Appearance: Normal	  HEENT:   Normal oral mucosa, PERRL, EOMI	  Lymphatic: No lymphadenopathy  Cardiovascular: Normal S1 S2, No JVD, No murmurs, No edema  Respiratory: Lungs clear to auscultation	  Psychiatry: A & O x 3, Mood & affect appropriate  Gastrointestinal:  Soft, Non-tender, + BS	  Skin: No rashes, No ecchymoses, No cyanosis	  Neurologic: Non-focal  Extremities: Normal range of motion, No clubbing, cyanosis or edema  Vascular: Peripheral pulses palpable 2+ bilaterally    MEDICATIONS  (STANDING):  amiodarone    Tablet 200 milliGRAM(s) Oral daily  ampicillin/sulbactam  IVPB 3 Gram(s) IV Intermittent every 6 hours  apixaban 5 milliGRAM(s) Oral every 12 hours  aspirin  chewable 81 milliGRAM(s) Oral daily  atorvastatin 20 milliGRAM(s) Oral at bedtime  digoxin     Tablet 0.125 milliGRAM(s) Oral every other day  furosemide    Tablet 20 milliGRAM(s) Oral daily  metoprolol succinate  milliGRAM(s) Oral daily  sacubitril 97 mG/valsartan 103 mG 1 Tablet(s) Oral two times a day  spironolactone 25 milliGRAM(s) Oral daily      	  LABS:	 	                       14.0   9.9   )-----------( 194      ( 05 Oct 2018 07:16 )             43.8     10-05    140  |  107  |  13  ----------------------------<  100<H>  4.2   |  25  |  1.59<H>    Ca    9.8      05 Oct 2018 07:16    TPro  7.3  /  Alb  3.6  /  TBili  1.0  /  DBili  x   /  AST  14  /  ALT  24  /  AlkPhos  49  10-05    Blood cx-.

## 2018-10-05 NOTE — PROGRESS NOTE ADULT - ASSESSMENT
1.	Bacteremia with group B strep  2.	Left leg cellulitis - mild  ·	cont unasyn 3gm IV q6h  D4  ·	awaiting repeat BC from 10/03 results   ·	likely dc home tomorrow on PO antibiotics
58 yo M with PMH of CHF, HTN, AICD( since March 2015) was called to the ED because his blood cultures taken on his visit to ED on Thursday Oct 27, 2018 came back positive for group b Streptococcus agalactiae.
KIARA in a patient with Cardiomyopathy on Entresto and diuretics.  Possible dehydration and sepsis now improving.    History of CHF , EF 25%.  Valvular disease and left/right heart failure.        Continue current treatments.    Enlarged prostate to follow with urology as an outpatient.
Pt is a 57 yr old male with PMHX of CHF,S/P AICD,HTN,PAF here with strep bacteremia from lt leg cellulitis.  1.ABX as per ID.  2.PAF-amiodarone,toprol,eliquis.  3.CRI-Renal f/u  4.Lipid D/O-statin.  5.PPI.
Pt is a 57 yr old male with PMHX of CHF,S/P AICD,HTN,PAF here with strep bacteremia.  1.ABX as per ID.  2.PAF-amiodarone,toprol,eliquis.  3.CRI-Renal eval appreciated.  4.Lipid D/O-statin.  5.PPI.
Pt is a 57 yr old male with PMHX of CHF,S/P AICD,HTN,PAF here with strep bacteremia.  1.ABX as per ID.  2.PAF-amiodarone,toprol,eliquis.  3.CRI-Renal eval appreciated.  4.Lipid D/O-statin.  5.PPI.
1.	Bacteremia with group B strep  2.	Left leg cellulitis - resolved  ·	dc planning today  ·	dc on ceftin 500mg PO BID x 10 days  ·	reconsult prn
1.	Bacteremia with group B strep  2.	Left leg cellulitis - mild  ·	cont unasyn 3gm IV q6h  D3  ·	repeat BC in pm  ·	likely dc home on Friday on PO antibiotics

## 2018-10-05 NOTE — DISCHARGE NOTE ADULT - ADDITIONAL INSTRUCTIONS
Follow up with Dr Goodman within 7 days from discharge.  Renal Dr Jacobson  Urology for enlarged prostate

## 2018-10-05 NOTE — DISCHARGE NOTE ADULT - HOSPITAL COURSE
Positive blood cultures.    Vital Signs Last 24 Hrs  T(C): 36.3 (04 Oct 2018 05:18), Max: 36.6 (03 Oct 2018 14:22)  T(F): 97.4 (04 Oct 2018 05:18), Max: 97.9 (03 Oct 2018 14:22)  HR: 70 (04 Oct 2018 05:18) (70 - 72)  BP: 119/75 (04 Oct 2018 05:18) (118/70 - 129/80)  BP(mean): --  RR: 16 (04 Oct 2018 05:18) (14 - 16)  SpO2: 100% (04 Oct 2018 05:18) (98% - 100%)    LABS:    Blood Blood-Peripheral  09-27 @ 13:42   Growth in aerobic bottle: Streptococcus agalactiae (Group B)  See previous culture [id number]  --  Blood Culture PCR  Streptococcus agalactiae (Group B)                        14.1   8.5   )-----------( 190      ( 03 Oct 2018 07:31 )             44.8     10-03    138  |  108  |  16  ----------------------------<  109<H>  4.3   |  24  |  1.37<H>    Ca    8.8      03 Oct 2018 07:31    TPro  7.2  /  Alb  3.7  /  TBili  0.7  /  DBili  x   /  AST  15  /  ALT  28  /  AlkPhos  48  10-03    < from: Xray Chest 2 Views PA/Lat (09.27.18 @ 09:44) >  Left cardiac pacer in position. No change borderline to mild   cardiomegaly. No consolidation or effusion.    < from: Transthoracic Echocardiogram (10.02.18 @ 07:44) >  LVMI: 182 g/m2  RWT: 0.40  Ejection Fraction Visual Estimate: 30-35 %    ------------------------------------------------------------------------  OBSERVATIONS:  Mitral Valve: Normal mitral valve. Mild mitral  regurgitation.  Aortic Root: Aortic Root: 3.6 cm.    Aortic Valve: Normal trileaflet aortic valve.  Left Atrium: Moderate left atrial enlargement.  Left Ventricle: Dilated LV with severe global left  ventricular systolic dysfunction. Normal left ventricular  internal dimensions and wall thicknesses. Grade III  diastolic dysfunction.  Right Heart: Normal right atrium. Normal right ventricular  size and systolic function.A device lead is visualized in  the right heart. Normal tricuspid valve. Normal pulmonic  valve.  Pericardium/PleuraNormal pericardium with no pericardial  effusion.  ------------------------------------------------------------------------  CONCLUSIONS:  1. Normal mitral valve. Mild mitral regurgitation.  2. Normal trileaflet aortic valve.  3. Aortic Root: 3.6 cm.  4. Moderate left atrial enlargement.  5. Normal left ventricular internal dimensions and wall  thicknesses.  6. Dilated LV with severe global left ventricular systolic  dysfunction.  7. Grade III diastolic dysfunction.  8. Normal right atrium.  9. Normal right ventricular size and systolic function.A  device lead is visualized in the right heart.  10.Normal tricuspid valve.  11. Normal pulmonic valve.  12. Normal pericardium with no pericardial effusion.    ------------------------------------------------------------------------  Confirmed on  10/3/2018 - 07:49:50 by Jennifer Goodman MD  ------------------------------------------------------------------------    < end of copied text >

## 2018-10-05 NOTE — DISCHARGE NOTE ADULT - CARE PROVIDER_API CALL
Jennifer Goodman), Internal Medicine  [address] [address]  Phone: [phone]  Fax: [phone]    Kobe Jacobson  [address] [place] [address]  [id number]  Phone: [phone]  Fax: (       -

## 2018-10-05 NOTE — PROGRESS NOTE ADULT - REASON FOR ADMISSION
positive blood culture taken during ED visit on Thursday

## 2018-10-06 LAB
CULTURE RESULTS: SIGNIFICANT CHANGE UP
CULTURE RESULTS: SIGNIFICANT CHANGE UP
SPECIMEN SOURCE: SIGNIFICANT CHANGE UP
SPECIMEN SOURCE: SIGNIFICANT CHANGE UP

## 2018-10-09 LAB
CULTURE RESULTS: SIGNIFICANT CHANGE UP
SPECIMEN SOURCE: SIGNIFICANT CHANGE UP

## 2018-12-08 NOTE — PROGRESS NOTE ADULT - PROVIDER SPECIALTY LIST ADULT
Infectious Disease Telephone Encounter by Vanessa Santiago at 02/03/17 10:19 AM     Author:  Vanessa Santiago Service:  (none) Author Type:  Patient      Filed:  02/03/17 10:25 AM Encounter Date:  2/3/2017 Status:  Signed     :  Vanessa Santiago (Patient )              JENELLE GUTIERREZ    Patient Age: 22 year old    ACCT STATUS:   MESSAGE:[AH1.1T]   Pt. no showed  scheduled appointment   2.3.17  9:50   ND    Please advise.[AH1.1M]       Next and Last Visit with Provider and Department  Next visit with CELESTINE BERGER is on No match found  Next visit with UROLOGY is on No match found  Last visit with CELESTINE BERGER was on 11/09/2016 at  1:30 PM in UROLOGY [facility]  Last visit with UROLOGY was on 11/09/2016 at  1:30 PM in UROLOGY [facility]     WEIGHT AND HEIGHT: As of 11/09/2016 weight is 190 lbs.(86.183 kg). Height is 5' 4\"(1.626 m).   BMI is 32.60 kg/(m^2) calculated from:     Height 5' 4\" (1.626 m) as of 11/9/16     Weight 190 lb (86.183 kg) as of 11/9/16      No Known Allergies  Current outpatient prescriptions       Medication  Sig Dispense Refill   • atenolol (TENORMIN) 25 MG tablet TK 1 T PO QD  6   • omeprazole (PRILOSEC) 20 MG Cap TK 1 C PO QD  4   • oxybutynin (DITROPAN) 5 MG tablet Take 1 Tab by mouth 2 (two) times daily. 60 Tab 11   • EPIDUO 0.1-2.5 % gel Apply 1 Film topically nightly. 45 g 2      PHARMACY to use:[AH1.1T] on file[AH1.1M]            Pharmacy preference(s) on file:    [facility] [address]  [facility] [address]    CALL BACK INFO:[AH1.1T] DO NOT LEAVE A MESSAGE - contact patient directly[AH1.1M]  ROUTING:[AH1.1T] Patient's physician/staff[AH1.1M]        PCP: Charlee Peralta MD         INS: Payor: [employer] / Plan: *No Plan* / Product Type: *No Product type* / Note: This is the primary coverage, but no account was found for this location or the patient's primary location.   ADDRESS:  [address] [place] [address] [place] [address][AH1.1T]          Revision History        User Key Date/Time User Provider Type Action    > AH1.1 02/03/17 10:25 AM Vanessa Santiago Patient  Sign    M - Manual, T - Template

## 2019-08-09 ENCOUNTER — EMERGENCY (EMERGENCY)
Facility: HOSPITAL | Age: 59
LOS: 1 days | Discharge: HOME | End: 2019-08-09
Attending: EMERGENCY MEDICINE
Payer: COMMERCIAL

## 2019-08-09 VITALS
TEMPERATURE: 99 F | RESPIRATION RATE: 20 BRPM | HEART RATE: 76 BPM | OXYGEN SATURATION: 99 % | SYSTOLIC BLOOD PRESSURE: 127 MMHG | HEIGHT: 71 IN | WEIGHT: 270.07 LBS | DIASTOLIC BLOOD PRESSURE: 79 MMHG

## 2019-08-09 DIAGNOSIS — Z95.810 PRESENCE OF AUTOMATIC (IMPLANTABLE) CARDIAC DEFIBRILLATOR: Chronic | ICD-10-CM

## 2019-08-09 PROCEDURE — 99282 EMERGENCY DEPT VISIT SF MDM: CPT

## 2019-08-09 NOTE — ED PROVIDER NOTE - NSFOLLOWUPINSTRUCTIONS_ED_ALL_ED_FT
This was likely a lipoma or an infected cyst. Watch for any redness or pain to the area.  See a dermatologist next week.  Please follow up with your personal medical doctor in 24-48 hours.   Bring results from today to your visit.  If your symptoms change, get worse or if you have any new symptoms, come to the ER right away.  If you have any questions, call the ER at the phone number on this page.

## 2019-08-09 NOTE — ED PROVIDER NOTE - OBJECTIVE STATEMENT
57 y/o with PMHx of CHF, HLD, HTN and PSHx of AICD presenting to ED with worsening lump to the back of neck x 2 weeks. Pt relates that affected area is beginning to increase in warmth, pain and is noted to have a point at the end. Pt took Cefuroxime (or Cefdinir), with improvement of redness and pain. Pt is not currently in pain and reports feeling better. Pt denies fever, recent trauma or any other acute complaints. NKDA

## 2019-08-09 NOTE — ED PROVIDER NOTE - NSFOLLOWUPCLINICS_GEN_ALL_ED_FT
[facility] [place]  Dermatology  [address], [address]  [place] [address]  Phone: [phone]  Fax: [phone]  Follow Up Time: 4-6 Days

## 2019-08-09 NOTE — ED PROVIDER NOTE - CLINICAL SUMMARY MEDICAL DECISION MAKING FREE TEXT BOX
57 y/o pt presenting to ED with lipoma vs treated cellulitis vs treated infected cyst. Discussed return precautions with pt and F/u with Derm.

## 2019-09-13 NOTE — ED PROVIDER NOTE - MEDICAL DECISION MAKING DETAILS
Darrell José MD, saw and evaluated the patient  I have discussed the patient with the resident/non-physician practitioner and agree with the resident's/non-physician practitioner's findings, Plan of Care, and MDM as documented in the resident's/non-physician practitioner's note, except where noted  All available labs and Radiology studies were reviewed  I was present for key portions of any procedure(s) performed by the resident/non-physician practitioner and I was immediately available to provide assistance  At this point I agree with the current assessment done in the Emergency Department  I have conducted an independent evaluation of this patient a history and physical is as follows:43 y/o female with abdominal cramping    Missed last period  Took several home pregnancy tests which were negative  Heart RRR, lungs CTA, abdomen soft, mild epigastric tenderness        Critical Care Time  Procedures Deshawn Holden, PGY2: 56M pmh CHF, AICD, HTN p/w dizziness, dyspnea, palpitations, new onset afib. A/C with heparin, CXR, echo bedside, labs, cardiac enzymes Deshawn Holden, PGY2: 56M pmh CHF, AICD, HTN p/w dizziness, dyspnea, palpitations, new onset afib. A/C with heparin, CXR, echo bedside, labs, cardiac enzymes  Attending Tashi: 55 y/o male h/o chf in the past presenting with intermittent afib and worsening sob. pt evaluated at [facility] for possible heart transplant. upon arrival pt in NAD. d/w pt's cardiologist who request admission. elevated [id number], therefore will start anticoagulation. no pleuritic pain or isolated LE edema to suggest PE. will check labs, cxr, and anticoagulate. plan to admit

## 2019-11-09 NOTE — ED PROVIDER NOTE - TIMING
Paged MD Bunch around 1110 for pt concerns about BG being 252. Pt states he take metformin BID at home and there are not orders for this home med currently. Pt is also concern with the doses of insulin he is receiving for meals. States that he would dose a lot more insulin than what we currently have orders for. Pt has also stated to the nurse that he would just use his home insulin if we did not get any orders to treat these high BG.    Update: MD ordered in between meal novolog. But pt instead that he gets 10 units for BG of 252 or he would just take his own home insulin. 10 units of novolog was given . Will continue to monitor pt and BG.  
Pt insisting more insulin than scheduled. States will take his own insulin if we do not give.  
gradual onset/worsening

## 2020-07-16 NOTE — ED PROVIDER NOTE - EYES, MLM
Clear bilaterally, pupils equal, round and reactive to light. Gabapentin Pregnancy And Lactation Text: This medication is Pregnancy Category C and isn't considered safe during pregnancy. It is excreted in breast milk.

## 2021-01-05 NOTE — PATIENT PROFILE ADULT. - PRO ANTICIPATED DISCH DISP
I have complete hiren in the body's ability to heal and transform. The products and items listed below (the “Products”)  and their claims have not been evaluated by the Food and Drug Administration.  Dietary products are not intended to treat, prevent, m home

## 2023-03-02 NOTE — PATIENT PROFILE ADULT. - NS TRANSFER DISPOSITION PATIENT BELONGINGS
[facility] Progress Note    Admitting Date and Time: 3/1/2023  5:58 PM  Admit Dx: Epigastric pain [R10.13]  Pancreatitis, recurrent [K85.90]  Acute pancreatitis, unspecified complication status, unspecified pancreatitis type [K85.90]  Nausea and vomiting, unspecified vomiting type [R11.2]    Subjective:  Mr. Virginia Taylor is a 71-year-old male with past medical history significant for pancreatitis who presents with abdominal pain found to have acute on chronic pancreatitis. No acute events overnight. Patient was to have CTA triphasic pancreas protocol but requested IV Benadryl, unable to complete due to waiver needed. CTA triphasic pancreas protocol will be canceled. Patient reports abdominal pain this morning rated 8 out of 10. Requesting increase frequency of IV Dilaudid dosing. He denies chest pain, shortness of breath, nausea, vomiting, headache.     ROS: denies fever, chills, cp, sob, n/v, HA unless stated above.      escitalopram  10 mg Oral Daily    lipase-protease-amylase  40,000 Units Oral TID WC    sodium chloride flush  5-40 mL IntraVENous 2 times per day    enoxaparin  40 mg SubCUTAneous Daily     ondansetron, 4 mg, TID PRN  HYDROmorphone, 1 mg, Q3H PRN  diphenhydrAMINE, 50 mg, Once PRN  sodium chloride flush, 5-40 mL, PRN  sodium chloride, , PRN  ondansetron, 4 mg, Q6H PRN  potassium chloride, 40 mEq, PRN   Or  potassium alternative oral replacement, 40 mEq, PRN   Or  potassium chloride, 10 mEq, PRN  diphenhydrAMINE, 25 mg, Q6H PRN         Objective:    BP (!) 136/105   Pulse 67   Temp 98.2 °F (36.8 °C) (Oral)   Resp 18   Ht 5' 11\" (1.803 m)   Wt 164 lb (74.4 kg)   SpO2 99%   BMI 22.87 kg/m²     General Appearance: alert and oriented to person, place and time and in no acute distress  Skin: warm and dry  Head: normocephalic and atraumatic  Eyes: pupils equal, round, and reactive to light, extraocular eye movements intact, conjunctivae normal  Neck: neck supple and non tender without mass   Pulmonary/Chest: clear to auscultation bilaterally- no wheezes, rales or rhonchi, normal air movement, no respiratory distress  Cardiovascular: normal rate, normal S1 and S2 and no carotid bruits  Abdomen: soft, bilateral upper abdominal quadrant tenderness to soft palpation, non-distended, normal bowel sounds, no masses or organomegaly  Extremities: no cyanosis, no clubbing and no edema  Neurologic: no cranial nerve deficit and speech normal        Recent Labs     03/01/23 1836 03/02/23  0419    137   K 3.3* 3.9   * 103   CO2 26 26   BUN 5* 6   CREATININE 0.6* 0.6*   GLUCOSE 118* 93   CALCIUM 7.9* 8.6       Recent Labs     03/01/23 1836 03/02/23  0419   WBC 8.8 7.7   RBC 4.73 4.50   HGB 16.5 15.2   HCT 46.1 44.6   MCV 97.5 99.1   MCH 34.9 33.8   MCHC 35.8* 34.1   RDW 12.3 12.3    157   MPV 8.9 8.8       Radiology: No new imaging to review. Assessment:    Principal Problem:    Pancreatitis, recurrent  Active Problems:    Acute pancreatitis, unspecified complication status, unspecified pancreatitis type  Resolved Problems:    * No resolved hospital problems. *      Plan:  1. Acute on chronic pancreatitis-continue IV fluids, increase IV Dilaudid to 1 mg every 3 hours as needed due to uncontrolled pain. Continue Zenpep. Clear liquid diet as tolerated. 2.  History of ERCP for choledocholithiasis and stent removal (December 2022)  3. Elevated LFTs-monitor with daily labs  4. Nausea and vomiting-antiemetics as needed  5. Anxiety-continue Lexapro. Code status: Full code  DVT prophylaxis: Lovenox      NOTE: This report was transcribed using voice recognition software. Every effort was made to ensure accuracy; however, inadvertent computerized transcription errors may be present.   Electronically signed by Jaci Antunez MD on 3/2/2023 at 11:35 AM with patient

## 2023-06-14 PROBLEM — Z00.00 ENCOUNTER FOR PREVENTIVE HEALTH EXAMINATION: Status: ACTIVE | Noted: 2023-06-14

## 2023-06-15 ENCOUNTER — TRANSCRIPTION ENCOUNTER (OUTPATIENT)
Age: 63
End: 2023-06-15

## 2023-06-15 ENCOUNTER — APPOINTMENT (OUTPATIENT)
Dept: CV DIAGNOSITCS | Facility: HOSPITAL | Age: 63
End: 2023-06-15

## 2023-06-15 ENCOUNTER — OUTPATIENT (OUTPATIENT)
Dept: OUTPATIENT SERVICES | Facility: HOSPITAL | Age: 63
LOS: 1 days | End: 2023-06-15
Payer: COMMERCIAL

## 2023-06-15 DIAGNOSIS — Z95.810 PRESENCE OF AUTOMATIC (IMPLANTABLE) CARDIAC DEFIBRILLATOR: Chronic | ICD-10-CM

## 2023-06-15 PROCEDURE — 85027 COMPLETE CBC AUTOMATED: CPT

## 2023-06-15 PROCEDURE — 80048 BASIC METABOLIC PNL TOTAL CA: CPT

## 2023-06-15 PROCEDURE — 85610 PROTHROMBIN TIME: CPT

## 2023-06-15 PROCEDURE — 93005 ELECTROCARDIOGRAM TRACING: CPT

## 2023-06-15 RX ORDER — SPIRONOLACTONE 25 MG/1
1 TABLET, FILM COATED ORAL
Qty: 0 | Refills: 0 | DISCHARGE

## 2023-06-15 RX ORDER — FUROSEMIDE 40 MG
1 TABLET ORAL
Qty: 0 | Refills: 0 | DISCHARGE

## 2023-06-15 RX ORDER — METOPROLOL TARTRATE 50 MG
1 TABLET ORAL
Qty: 0 | Refills: 0 | DISCHARGE

## 2023-06-15 RX ORDER — DIGOXIN 250 MCG
1 TABLET ORAL
Qty: 0 | Refills: 0 | DISCHARGE

## 2023-06-15 RX ORDER — SACUBITRIL AND VALSARTAN 24; 26 MG/1; MG/1
1 TABLET, FILM COATED ORAL
Qty: 0 | Refills: 0 | DISCHARGE

## 2023-06-16 PROBLEM — Z87.891 PERSONAL HISTORY OF NICOTINE DEPENDENCE: Chronic | Status: ACTIVE | Noted: 2023-06-15

## 2023-06-21 ENCOUNTER — APPOINTMENT (OUTPATIENT)
Dept: CV DIAGNOSITCS | Facility: HOSPITAL | Age: 63
End: 2023-06-21

## 2023-06-21 ENCOUNTER — OUTPATIENT (OUTPATIENT)
Dept: OUTPATIENT SERVICES | Facility: HOSPITAL | Age: 63
LOS: 1 days | End: 2023-06-21
Payer: COMMERCIAL

## 2023-06-21 VITALS
OXYGEN SATURATION: 97 % | DIASTOLIC BLOOD PRESSURE: 74 MMHG | HEART RATE: 84 BPM | SYSTOLIC BLOOD PRESSURE: 98 MMHG | RESPIRATION RATE: 14 BRPM

## 2023-06-21 VITALS — RESPIRATION RATE: 16 BRPM | HEIGHT: 70 IN | WEIGHT: 261.91 LBS

## 2023-06-21 DIAGNOSIS — I48.91 UNSPECIFIED ATRIAL FIBRILLATION: ICD-10-CM

## 2023-06-21 DIAGNOSIS — Z95.810 PRESENCE OF AUTOMATIC (IMPLANTABLE) CARDIAC DEFIBRILLATOR: Chronic | ICD-10-CM

## 2023-06-21 LAB
ANION GAP SERPL CALC-SCNC: 14 MMOL/L — SIGNIFICANT CHANGE UP (ref 5–17)
APTT BLD: 35.6 SEC — HIGH (ref 27.5–35.5)
BUN SERPL-MCNC: 17 MG/DL — SIGNIFICANT CHANGE UP (ref 7–23)
CALCIUM SERPL-MCNC: 9.1 MG/DL — SIGNIFICANT CHANGE UP (ref 8.4–10.5)
CHLORIDE SERPL-SCNC: 106 MMOL/L — SIGNIFICANT CHANGE UP (ref 96–108)
CO2 SERPL-SCNC: 20 MMOL/L — LOW (ref 22–31)
CREAT SERPL-MCNC: 1.3 MG/DL — SIGNIFICANT CHANGE UP (ref 0.5–1.3)
EGFR: 62 ML/MIN/1.73M2 — SIGNIFICANT CHANGE UP
GLUCOSE SERPL-MCNC: 112 MG/DL — HIGH (ref 70–99)
HCT VFR BLD CALC: 50 % — SIGNIFICANT CHANGE UP (ref 39–50)
HGB BLD-MCNC: 16.2 G/DL — SIGNIFICANT CHANGE UP (ref 13–17)
INR BLD: 1.37 RATIO — HIGH (ref 0.88–1.16)
MAGNESIUM SERPL-MCNC: 1.8 MG/DL — SIGNIFICANT CHANGE UP (ref 1.6–2.6)
MCHC RBC-ENTMCNC: 31.5 PG — SIGNIFICANT CHANGE UP (ref 27–34)
MCHC RBC-ENTMCNC: 32.4 GM/DL — SIGNIFICANT CHANGE UP (ref 32–36)
MCV RBC AUTO: 97.1 FL — SIGNIFICANT CHANGE UP (ref 80–100)
NRBC # BLD: 0 /100 WBCS — SIGNIFICANT CHANGE UP (ref 0–0)
PLATELET # BLD AUTO: 150 K/UL — SIGNIFICANT CHANGE UP (ref 150–400)
POTASSIUM SERPL-MCNC: 4 MMOL/L — SIGNIFICANT CHANGE UP (ref 3.5–5.3)
POTASSIUM SERPL-SCNC: 4 MMOL/L — SIGNIFICANT CHANGE UP (ref 3.5–5.3)
PROTHROM AB SERPL-ACNC: 16 SEC — HIGH (ref 10.5–13.4)
RBC # BLD: 5.15 M/UL — SIGNIFICANT CHANGE UP (ref 4.2–5.8)
RBC # FLD: 14 % — SIGNIFICANT CHANGE UP (ref 10.3–14.5)
SODIUM SERPL-SCNC: 140 MMOL/L — SIGNIFICANT CHANGE UP (ref 135–145)
WBC # BLD: 6.49 K/UL — SIGNIFICANT CHANGE UP (ref 3.8–10.5)
WBC # FLD AUTO: 6.49 K/UL — SIGNIFICANT CHANGE UP (ref 3.8–10.5)

## 2023-06-21 PROCEDURE — 93010 ELECTROCARDIOGRAM REPORT: CPT

## 2023-06-21 PROCEDURE — 93312 ECHO TRANSESOPHAGEAL: CPT

## 2023-06-21 PROCEDURE — 93312 ECHO TRANSESOPHAGEAL: CPT | Mod: 26

## 2023-06-21 PROCEDURE — 83735 ASSAY OF MAGNESIUM: CPT

## 2023-06-21 PROCEDURE — 85730 THROMBOPLASTIN TIME PARTIAL: CPT

## 2023-06-21 PROCEDURE — 93306 TTE W/DOPPLER COMPLETE: CPT

## 2023-06-21 PROCEDURE — 80048 BASIC METABOLIC PNL TOTAL CA: CPT

## 2023-06-21 PROCEDURE — 85027 COMPLETE CBC AUTOMATED: CPT

## 2023-06-21 PROCEDURE — 85610 PROTHROMBIN TIME: CPT

## 2023-06-21 PROCEDURE — 93306 TTE W/DOPPLER COMPLETE: CPT | Mod: 26

## 2023-06-21 PROCEDURE — 92960 CARDIOVERSION ELECTRIC EXT: CPT

## 2023-06-21 PROCEDURE — 93005 ELECTROCARDIOGRAM TRACING: CPT

## 2023-06-21 RX ORDER — APIXABAN 2.5 MG/1
5 TABLET, FILM COATED ORAL
Refills: 0 | Status: DISCONTINUED | OUTPATIENT
Start: 2023-06-21 | End: 2023-07-05

## 2023-06-21 RX ADMIN — APIXABAN 5 MILLIGRAM(S): 2.5 TABLET, FILM COATED ORAL at 07:12

## 2023-06-21 NOTE — H&P CARDIOLOGY - NSICDXFAMILYHX_GEN_ALL_CORE_FT
FAMILY HISTORY:  Sibling  Still living? No  Family history of cardiac arrest, Age at diagnosis: Age Unknown    Grandparent  Still living? Unknown  FH: heart attack, Age at diagnosis: Age Unknown    Uncle  Still living? No  FH: heart attack, Age at diagnosis: Age Unknown

## 2023-06-21 NOTE — PRE-ANESTHESIA EVALUATION ADULT - WEIGHT IN LBS
261.9 Valtrex Counseling: I discussed with the patient the risks of valacyclovir including but not limited to kidney damage, nausea, vomiting and severe allergy.  The patient understands that if the infection seems to be worsening or is not improving, they are to call.

## 2023-06-21 NOTE — ASU PATIENT PROFILE, ADULT - NSICDXPASTMEDICALHX_GEN_ALL_CORE_FT
PAST MEDICAL HISTORY:  AICD (automatic cardioverter/defibrillator) present     CHF (congestive heart failure)     Former smoker     Hyperlipidemia     Hypertension

## 2023-06-21 NOTE — PRE PROCEDURE NOTE - PRE PROCEDURE EVALUATION
Pre Procedure Note:    Procedure Service:  Cardiology   Procedure Name: Transesophageal Echo  Pre Procedure Evaluation:    HPI: 62 year old M with history of afib presents for Transesophageal echocardiogram.    RELEVANT PMH/PSH:    Any respiratory problems: Asthma, COPD, LICO, recent respiratory illness? NO    Any history of Atlantoaxial disease and severe generalized cervical arthritis? NO    Oral/Dental history: Any dentures, removable, loose, broken tooth/teeth, mouth sores? NO    Significant dysphagia and odynophagia? NO    Any GI history of: Esophageal surgeries, strictures, diverticula, masses, varices, diaphragmatic hernia, stomach ulcers, stomach surgeries, bariatric surgery, GI bleed? NO    SOCIAL HISTORY:   [ ] Non-smoker [ ] Smoker [ ] Alcohol  ALLERGIES: No Known Allergies  MEDICATION: REVIEWED  REVIEW OF SYSTEMS:  CONSTITUTIONAL: No fever, weight loss, or fatigue  HEENT: No eye issues, No sinus or throat pain; No pain or stiffness at neck  RESPIRATORY: No cough, wheezing, chills or hemoptysis; Shortness of Breath  CARDIOVASCULAR: No chest pain, palpitations, passing out, dizziness, or leg swelling  GASTROINTESTINAL: No abd pain, nausea, vomiting diarrhea constipation. No melena or hematochezia.  NEUROLOGICAL: No headaches, memory loss, acute change in mental status  MUSCULOSKELETAL: No significant muscle, back, or extremity pain  HEME/LYMPH: No easy bruising, or bleeding gums    PHYSICAL EXAM:  Vital Signs:  BP  117  /83            O2 sat  100% on RA   Height      weight   Appearance: Normal	  HEENT:   Normal oral mucosa, PERRL, EOMI	  Cardiovascular: Normal S1 S2, No JVD, No murmurs, No edema  Respiratory: Lungs clear to auscultation	  Gastrointestinal:  Soft, Non-tender, + BS	  Neurologic/PSY: Non-focal, A&O x 3  Extremities: No clubbing, cyanosis or edema    LABS: reviewed  CARDIAC TESTING/STUDIES:    [ ] ECG  [x ] Echocardiogram:  [ ] Catheterization:  [ ] Stress Test:  	  [ ] PPM/AICD:	    Plan: 	  LORE Procedure Candidate	YES    Day of Procedure Attestation:  I have personally seen, examined, and participated in the care of this patient. I have reviewed all pertinent information, including history, physical exam, sedation plan, and agree except as noted.    Attestation Statements:  I have personally seen and examined the patient.  I fully participated in the care of this patient.  I have made amendments to the documentation where necessary, and agree with the history, physical exam, and plan as documented by the Fellow.

## 2023-06-21 NOTE — H&P CARDIOLOGY - HISTORY OF PRESENT ILLNESS
61 yo Male, current smoker, with strong family Hx of CAD (sister at age 42, maternal and paternal grandfathers), PMHx of HTN, HLD, prediabetes, severe LVSF s/p AICD (March 2015), prediabetes, BPH, A-fib diagnosed Aug 2017, s/p DCCV and ablation (in 2017), who was evaluated by Dr. Euceda and was told having recurrent A-fib since April 5th and was scheduled for LORE/DCCV.  Pt's DCCV was cancelled last week on 6/15/23 due to not holding Farxiga and returns today, last Farxiga dose was last Friday.  But Pt reports taking Eliquis only once a day without letting Dr Euceda know.  He reports reducing frequency of both Entresto and Eliquis to only once a day already for several months due to stomach discomfort.  Pt denies palpitation or SOB but needed to take his diuretic daily, reports feeling dizzy occasionally.    Card Dr Euceda      *** Dr Euceda made aware of pt taking Eliquis only once daily (last dose at midnight), will give another STAT dose of Eliquis now and do LORE as scheduled.

## 2023-07-13 DIAGNOSIS — I48.91 UNSPECIFIED ATRIAL FIBRILLATION: ICD-10-CM

## 2023-11-10 ENCOUNTER — OUTPATIENT (OUTPATIENT)
Dept: OUTPATIENT SERVICES | Facility: HOSPITAL | Age: 63
LOS: 1 days | End: 2023-11-10
Payer: COMMERCIAL

## 2023-11-10 ENCOUNTER — TRANSCRIPTION ENCOUNTER (OUTPATIENT)
Age: 63
End: 2023-11-10

## 2023-11-10 VITALS
SYSTOLIC BLOOD PRESSURE: 124 MMHG | OXYGEN SATURATION: 97 % | DIASTOLIC BLOOD PRESSURE: 70 MMHG | HEART RATE: 70 BPM | RESPIRATION RATE: 17 BRPM

## 2023-11-10 VITALS
DIASTOLIC BLOOD PRESSURE: 78 MMHG | RESPIRATION RATE: 14 BRPM | SYSTOLIC BLOOD PRESSURE: 145 MMHG | HEIGHT: 70 IN | TEMPERATURE: 97 F | OXYGEN SATURATION: 98 % | HEART RATE: 71 BPM | WEIGHT: 171.96 LBS

## 2023-11-10 DIAGNOSIS — Z95.810 PRESENCE OF AUTOMATIC (IMPLANTABLE) CARDIAC DEFIBRILLATOR: Chronic | ICD-10-CM

## 2023-11-10 DIAGNOSIS — Z45.010 ENCOUNTER FOR CHECKING AND TESTING OF CARDIAC PACEMAKER PULSE GENERATOR [BATTERY]: ICD-10-CM

## 2023-11-10 LAB
ANION GAP SERPL CALC-SCNC: 14 MMOL/L — SIGNIFICANT CHANGE UP (ref 5–17)
ANION GAP SERPL CALC-SCNC: 14 MMOL/L — SIGNIFICANT CHANGE UP (ref 5–17)
BUN SERPL-MCNC: 12 MG/DL — SIGNIFICANT CHANGE UP (ref 7–23)
BUN SERPL-MCNC: 12 MG/DL — SIGNIFICANT CHANGE UP (ref 7–23)
CALCIUM SERPL-MCNC: 9.4 MG/DL — SIGNIFICANT CHANGE UP (ref 8.4–10.5)
CALCIUM SERPL-MCNC: 9.4 MG/DL — SIGNIFICANT CHANGE UP (ref 8.4–10.5)
CHLORIDE SERPL-SCNC: 104 MMOL/L — SIGNIFICANT CHANGE UP (ref 96–108)
CHLORIDE SERPL-SCNC: 104 MMOL/L — SIGNIFICANT CHANGE UP (ref 96–108)
CO2 SERPL-SCNC: 21 MMOL/L — LOW (ref 22–31)
CO2 SERPL-SCNC: 21 MMOL/L — LOW (ref 22–31)
CREAT SERPL-MCNC: 1.24 MG/DL — SIGNIFICANT CHANGE UP (ref 0.5–1.3)
CREAT SERPL-MCNC: 1.24 MG/DL — SIGNIFICANT CHANGE UP (ref 0.5–1.3)
EGFR: 66 ML/MIN/1.73M2 — SIGNIFICANT CHANGE UP
EGFR: 66 ML/MIN/1.73M2 — SIGNIFICANT CHANGE UP
GLUCOSE BLDC GLUCOMTR-MCNC: 134 MG/DL — HIGH (ref 70–99)
GLUCOSE BLDC GLUCOMTR-MCNC: 134 MG/DL — HIGH (ref 70–99)
GLUCOSE SERPL-MCNC: 98 MG/DL — SIGNIFICANT CHANGE UP (ref 70–99)
GLUCOSE SERPL-MCNC: 98 MG/DL — SIGNIFICANT CHANGE UP (ref 70–99)
HCT VFR BLD CALC: 47.9 % — SIGNIFICANT CHANGE UP (ref 39–50)
HCT VFR BLD CALC: 47.9 % — SIGNIFICANT CHANGE UP (ref 39–50)
HGB BLD-MCNC: 15.4 G/DL — SIGNIFICANT CHANGE UP (ref 13–17)
HGB BLD-MCNC: 15.4 G/DL — SIGNIFICANT CHANGE UP (ref 13–17)
MAGNESIUM SERPL-MCNC: 1.9 MG/DL — SIGNIFICANT CHANGE UP (ref 1.6–2.6)
MAGNESIUM SERPL-MCNC: 1.9 MG/DL — SIGNIFICANT CHANGE UP (ref 1.6–2.6)
MCHC RBC-ENTMCNC: 31.6 PG — SIGNIFICANT CHANGE UP (ref 27–34)
MCHC RBC-ENTMCNC: 31.6 PG — SIGNIFICANT CHANGE UP (ref 27–34)
MCHC RBC-ENTMCNC: 32.2 GM/DL — SIGNIFICANT CHANGE UP (ref 32–36)
MCHC RBC-ENTMCNC: 32.2 GM/DL — SIGNIFICANT CHANGE UP (ref 32–36)
MCV RBC AUTO: 98.4 FL — SIGNIFICANT CHANGE UP (ref 80–100)
MCV RBC AUTO: 98.4 FL — SIGNIFICANT CHANGE UP (ref 80–100)
NRBC # BLD: 0 /100 WBCS — SIGNIFICANT CHANGE UP (ref 0–0)
NRBC # BLD: 0 /100 WBCS — SIGNIFICANT CHANGE UP (ref 0–0)
PLATELET # BLD AUTO: 164 K/UL — SIGNIFICANT CHANGE UP (ref 150–400)
PLATELET # BLD AUTO: 164 K/UL — SIGNIFICANT CHANGE UP (ref 150–400)
POTASSIUM SERPL-MCNC: 3.7 MMOL/L — SIGNIFICANT CHANGE UP (ref 3.5–5.3)
POTASSIUM SERPL-MCNC: 3.7 MMOL/L — SIGNIFICANT CHANGE UP (ref 3.5–5.3)
POTASSIUM SERPL-SCNC: 3.7 MMOL/L — SIGNIFICANT CHANGE UP (ref 3.5–5.3)
POTASSIUM SERPL-SCNC: 3.7 MMOL/L — SIGNIFICANT CHANGE UP (ref 3.5–5.3)
RBC # BLD: 4.87 M/UL — SIGNIFICANT CHANGE UP (ref 4.2–5.8)
RBC # BLD: 4.87 M/UL — SIGNIFICANT CHANGE UP (ref 4.2–5.8)
RBC # FLD: 14.2 % — SIGNIFICANT CHANGE UP (ref 10.3–14.5)
RBC # FLD: 14.2 % — SIGNIFICANT CHANGE UP (ref 10.3–14.5)
SODIUM SERPL-SCNC: 139 MMOL/L — SIGNIFICANT CHANGE UP (ref 135–145)
SODIUM SERPL-SCNC: 139 MMOL/L — SIGNIFICANT CHANGE UP (ref 135–145)
WBC # BLD: 6.67 K/UL — SIGNIFICANT CHANGE UP (ref 3.8–10.5)
WBC # BLD: 6.67 K/UL — SIGNIFICANT CHANGE UP (ref 3.8–10.5)
WBC # FLD AUTO: 6.67 K/UL — SIGNIFICANT CHANGE UP (ref 3.8–10.5)
WBC # FLD AUTO: 6.67 K/UL — SIGNIFICANT CHANGE UP (ref 3.8–10.5)

## 2023-11-10 PROCEDURE — 85027 COMPLETE CBC AUTOMATED: CPT

## 2023-11-10 PROCEDURE — 82962 GLUCOSE BLOOD TEST: CPT

## 2023-11-10 PROCEDURE — 83735 ASSAY OF MAGNESIUM: CPT

## 2023-11-10 PROCEDURE — 80048 BASIC METABOLIC PNL TOTAL CA: CPT

## 2023-11-10 PROCEDURE — C1721: CPT

## 2023-11-10 PROCEDURE — 33263 RMVL & RPLCMT DFB GEN 2 LEAD: CPT

## 2023-11-10 PROCEDURE — 93005 ELECTROCARDIOGRAM TRACING: CPT

## 2023-11-10 PROCEDURE — 93010 ELECTROCARDIOGRAM REPORT: CPT | Mod: 76

## 2023-11-10 RX ORDER — CEFAZOLIN SODIUM 1 G
2000 VIAL (EA) INJECTION ONCE
Refills: 0 | Status: COMPLETED | OUTPATIENT
Start: 2023-11-10 | End: 2023-11-10

## 2023-11-10 RX ORDER — CEPHALEXIN 500 MG
500 CAPSULE ORAL EVERY 6 HOURS
Refills: 0 | Status: DISCONTINUED | OUTPATIENT
Start: 2023-11-10 | End: 2023-11-24

## 2023-11-10 RX ORDER — OXYCODONE AND ACETAMINOPHEN 5; 325 MG/1; MG/1
1 TABLET ORAL EVERY 6 HOURS
Refills: 0 | Status: DISCONTINUED | OUTPATIENT
Start: 2023-11-10 | End: 2023-11-10

## 2023-11-10 RX ORDER — FUROSEMIDE 40 MG
40 TABLET ORAL DAILY
Refills: 0 | Status: DISCONTINUED | OUTPATIENT
Start: 2023-11-10 | End: 2023-11-24

## 2023-11-10 RX ORDER — ATORVASTATIN CALCIUM 80 MG/1
40 TABLET, FILM COATED ORAL AT BEDTIME
Refills: 0 | Status: DISCONTINUED | OUTPATIENT
Start: 2023-11-10 | End: 2023-11-24

## 2023-11-10 RX ORDER — SACUBITRIL AND VALSARTAN 24; 26 MG/1; MG/1
1 TABLET, FILM COATED ORAL
Refills: 0 | Status: DISCONTINUED | OUTPATIENT
Start: 2023-11-10 | End: 2023-11-24

## 2023-11-10 RX ORDER — ACETAMINOPHEN 500 MG
2 TABLET ORAL
Qty: 0 | Refills: 0 | DISCHARGE
Start: 2023-11-10

## 2023-11-10 RX ORDER — APIXABAN 2.5 MG/1
1 TABLET, FILM COATED ORAL
Qty: 180 | Refills: 0
Start: 2023-11-10 | End: 2024-02-07

## 2023-11-10 RX ORDER — FINASTERIDE 5 MG/1
5 TABLET, FILM COATED ORAL DAILY
Refills: 0 | Status: DISCONTINUED | OUTPATIENT
Start: 2023-11-10 | End: 2023-11-24

## 2023-11-10 RX ORDER — CEPHALEXIN 500 MG
1 CAPSULE ORAL
Qty: 8 | Refills: 0
Start: 2023-11-10 | End: 2023-11-11

## 2023-11-10 RX ORDER — OXYCODONE AND ACETAMINOPHEN 5; 325 MG/1; MG/1
1 TABLET ORAL
Qty: 12 | Refills: 0
Start: 2023-11-10 | End: 2023-11-12

## 2023-11-10 RX ORDER — METOPROLOL TARTRATE 50 MG
100 TABLET ORAL DAILY
Refills: 0 | Status: DISCONTINUED | OUTPATIENT
Start: 2023-11-10 | End: 2023-11-24

## 2023-11-10 RX ORDER — ACETAMINOPHEN 500 MG
650 TABLET ORAL EVERY 6 HOURS
Refills: 0 | Status: DISCONTINUED | OUTPATIENT
Start: 2023-11-10 | End: 2023-11-24

## 2023-11-10 RX ORDER — SPIRONOLACTONE 25 MG/1
25 TABLET, FILM COATED ORAL DAILY
Refills: 0 | Status: DISCONTINUED | OUTPATIENT
Start: 2023-11-10 | End: 2023-11-24

## 2023-11-10 RX ORDER — POTASSIUM BICARBONATE 978 MG/1
25 TABLET, EFFERVESCENT ORAL ONCE
Refills: 0 | Status: COMPLETED | OUTPATIENT
Start: 2023-11-10 | End: 2023-11-10

## 2023-11-10 RX ORDER — ACETAMINOPHEN 500 MG
650 TABLET ORAL ONCE
Refills: 0 | Status: COMPLETED | OUTPATIENT
Start: 2023-11-10 | End: 2023-11-10

## 2023-11-10 RX ADMIN — Medication 100 MILLIGRAM(S): at 16:09

## 2023-11-10 RX ADMIN — Medication 650 MILLIGRAM(S): at 15:21

## 2023-11-10 RX ADMIN — Medication 650 MILLIGRAM(S): at 16:14

## 2023-11-10 RX ADMIN — POTASSIUM BICARBONATE 25 MILLIEQUIVALENT(S): 978 TABLET, EFFERVESCENT ORAL at 11:12

## 2023-11-10 NOTE — H&P CARDIOLOGY - HISTORY OF PRESENT ILLNESS
63 yo Male, current smoker, with strong family Hx of CAD (sister at age 42, maternal and paternal grandfathers), PMHx of HTN, HLD, prediabetes, severe LVSF s/p AICD (March 2015), prediabetes, BPH, A-fib diagnosed Aug 2017 (on Eliquis), Hx DCCV and ablation (in 2017), recent LORE/DCCV in June 2023 (LORE needed in the setting of pt taking Eliquis once daily).  Patient presents today for his ICD generator change with Dr Euceda.  Denies dizziness, diaphoresis, palpitations, nausea, vomiting, peripheral edema, recent weight gain, or syncope.      Card Dr Euceda   63 yo Male, current smoker, with strong family Hx of CAD (sister at age 42, maternal and paternal grandfathers), PMHx of HTN, HLD, prediabetes, severe LVSF s/p AICD (March 2015), prediabetes, BPH, A-fib diagnosed Aug 2017 (on Eliquis), Hx DCCV and ablation (in 2017), recent LORE/DCCV in June 2023 (LORE needed in the setting of pt taking Eliquis once daily).  Patient presents today for his ICD generator change with Dr Euceda.  Denies dizziness, diaphoresis, palpitations, nausea, vomiting, peripheral edema, recent weight gain, or syncope.      Card Dr Euceda      Pt took Eliquis last on 11/6 am.  Pt took Farxiga last yesterday am. EP nursing staff and dr Euceda notified.

## 2023-11-10 NOTE — PATIENT PROFILE ADULT - FALL HARM RISK - HARM RISK INTERVENTIONS

## 2023-11-10 NOTE — PATIENT PROFILE ADULT - FUNCTIONAL ASSESSMENT - DAILY ACTIVITY 3.
bilateral upper extremity Active ROM was WFL (within functional limits)/bilateral  lower extremity Active ROM was WFL (within functional limits) 4 = No assist / stand by assistance

## 2023-11-10 NOTE — ASU DISCHARGE PLAN (ADULT/PEDIATRIC) - CARE PROVIDER_API CALL
Parisa Euceda  Cardiovascular Disease  [address] [place] [id number]  Phone: [phone]  Fax: [phone]  Follow Up Time: 2 weeks

## 2023-11-10 NOTE — ASU DISCHARGE PLAN (ADULT/PEDIATRIC) - NS MD DC FALL RISK RISK
For information on Fall & Injury Prevention, visit: [email or web address] OR  [email or web address] OR  https://www.cdc.gov/steadi/patient.html

## 2023-11-10 NOTE — ASU DISCHARGE PLAN (ADULT/PEDIATRIC) - ASU DC SPECIAL INSTRUCTIONSFT
See post generator change instructions reviewed with you.  Please call dr Euceda's office to set Wound check appointment within 2 weeks  Continue all home meds.  Resume Eliquis on Monday 11/13.  Finish the 2 days antibiotics treatment prescribed to you.

## 2024-02-06 NOTE — PROGRESS NOTE ADULT - PROBLEM SELECTOR PROBLEM 4
CHF (congestive heart failure)
CHF (congestive heart failure)
Hypertension
Cardiac Monitor/Defib/ACLS/Rescue Kit/O2/BVM
